# Patient Record
Sex: MALE | Race: WHITE
[De-identification: names, ages, dates, MRNs, and addresses within clinical notes are randomized per-mention and may not be internally consistent; named-entity substitution may affect disease eponyms.]

---

## 2018-04-22 ENCOUNTER — HOSPITAL ENCOUNTER (EMERGENCY)
Dept: HOSPITAL 97 - ER | Age: 83
Discharge: HOME | End: 2018-04-22
Payer: COMMERCIAL

## 2018-04-22 VITALS — TEMPERATURE: 97.8 F

## 2018-04-22 VITALS — SYSTOLIC BLOOD PRESSURE: 123 MMHG | DIASTOLIC BLOOD PRESSURE: 90 MMHG

## 2018-04-22 VITALS — OXYGEN SATURATION: 97 %

## 2018-04-22 DIAGNOSIS — I48.91: ICD-10-CM

## 2018-04-22 DIAGNOSIS — M54.5: ICD-10-CM
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
## 2018-04-22 DIAGNOSIS — N18.9: ICD-10-CM
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
## 2018-04-22 DIAGNOSIS — S00.93XA: Primary | ICD-10-CM

## 2018-04-22 DIAGNOSIS — I25.2: ICD-10-CM

## 2018-04-22 DIAGNOSIS — Z79.01: ICD-10-CM

## 2018-04-22 DIAGNOSIS — I48.92: ICD-10-CM
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
## 2018-04-22 LAB
ALBUMIN SERPL BCP-MCNC: 3.8 G/DL (ref 3.2–5.5)
ALP SERPL-CCNC: 129 IU/L (ref 42–121)
ALT SERPL W P-5'-P-CCNC: 21 IU/L (ref 10–60)
AST SERPL W P-5'-P-CCNC: 27 IU/L (ref 10–42)
BUN BLD-MCNC: 24 MG/DL (ref 6–20)
CKMB CREATINE KINASE MB: 2.5 NG/ML (ref 0.3–4)
GLUCOSE SERPLBLD-MCNC: 97 MG/DL (ref 65–120)
HCT VFR BLD CALC: 36.8 % (ref 39.6–49)
INR BLD: 1.69
LIPASE SERPL-CCNC: 14 U/L (ref 22–51)
LYMPHOCYTES # SPEC AUTO: 1.6 K/UL (ref 0.7–4.9)
MAGNESIUM SERPL-MCNC: 2 MG/DL (ref 1.8–2.5)
MCH RBC QN AUTO: 34.3 PG (ref 27–35)
MCV RBC: 104.9 FL (ref 80–100)
PMV BLD: 8.7 FL (ref 7.6–11.3)
POTASSIUM SERPL-SCNC: 4.7 MEQ/L (ref 3.6–5)
RBC # BLD: 3.51 M/UL (ref 4.33–5.43)

## 2018-04-22 PROCEDURE — 93005 ELECTROCARDIOGRAM TRACING: CPT

## 2018-04-22 PROCEDURE — 71250 CT THORAX DX C-: CPT

## 2018-04-22 PROCEDURE — 70450 CT HEAD/BRAIN W/O DYE: CPT

## 2018-04-22 PROCEDURE — 81003 URINALYSIS AUTO W/O SCOPE: CPT

## 2018-04-22 PROCEDURE — 82553 CREATINE MB FRACTION: CPT

## 2018-04-22 PROCEDURE — 72125 CT NECK SPINE W/O DYE: CPT

## 2018-04-22 PROCEDURE — 87077 CULTURE AEROBIC IDENTIFY: CPT

## 2018-04-22 PROCEDURE — 99285 EMERGENCY DEPT VISIT HI MDM: CPT

## 2018-04-22 PROCEDURE — 84484 ASSAY OF TROPONIN QUANT: CPT

## 2018-04-22 PROCEDURE — 83880 ASSAY OF NATRIURETIC PEPTIDE: CPT

## 2018-04-22 PROCEDURE — 96374 THER/PROPH/DIAG INJ IV PUSH: CPT

## 2018-04-22 PROCEDURE — 80048 BASIC METABOLIC PNL TOTAL CA: CPT

## 2018-04-22 PROCEDURE — 80076 HEPATIC FUNCTION PANEL: CPT

## 2018-04-22 PROCEDURE — 85730 THROMBOPLASTIN TIME PARTIAL: CPT

## 2018-04-22 PROCEDURE — 36415 COLL VENOUS BLD VENIPUNCTURE: CPT

## 2018-04-22 PROCEDURE — 74176 CT ABD & PELVIS W/O CONTRAST: CPT

## 2018-04-22 PROCEDURE — 87186 SC STD MICRODIL/AGAR DIL: CPT

## 2018-04-22 PROCEDURE — 82550 ASSAY OF CK (CPK): CPT

## 2018-04-22 PROCEDURE — 85610 PROTHROMBIN TIME: CPT

## 2018-04-22 PROCEDURE — 87088 URINE BACTERIA CULTURE: CPT

## 2018-04-22 PROCEDURE — 85025 COMPLETE CBC W/AUTO DIFF WBC: CPT

## 2018-04-22 PROCEDURE — 83735 ASSAY OF MAGNESIUM: CPT

## 2018-04-22 PROCEDURE — 83690 ASSAY OF LIPASE: CPT

## 2018-04-22 PROCEDURE — 96361 HYDRATE IV INFUSION ADD-ON: CPT

## 2018-04-22 PROCEDURE — 87086 URINE CULTURE/COLONY COUNT: CPT

## 2018-04-22 PROCEDURE — 71045 X-RAY EXAM CHEST 1 VIEW: CPT

## 2018-04-22 NOTE — EDPHYS
Physician Documentation                                                                           

 [facility]                                                                

Name: Jos Aguayo Jr                                                                              

Age: 88 yrs                                                                                       

Sex: Male                                                                                         

: 1929                                                                                   

MRN: [id number]                                                                                   

Arrival Date: 2018                                                                          

Time: 16:24                                                                                       

Account#: [id number]                                                                            

Bed 5                                                                                             

Private MD:                                                                                       

ED Physician Santino Mcnair                                                                      

HPI:                                                                                              

                                                                                             

18:29 This 88 yrs old  Male presents to ER via EMS with complaints of Head Injury    constance 

      Without LOC-Adult.                                                                          

18:29 The patient or guardian reports pain, swelling, tenderness. The complaints affect the   constance 

      right side of the back of head. Context of injury: The problem was sustained at home.       

      Onset: The symptoms/episode began/occurred just prior to arrival. Associated signs and      

      symptoms: Loss of consciousness: This patient did not experience any loss of                

      consciousness. Severity of symptoms: At their worst the symptoms were very mild, in the     

      emergency department the symptoms have improved, markedly. The patient has experienced      

      similar episodes in the past, several times.                                                

                                                                                                  

Historical:                                                                                       

- Allergies:                                                                                      

16:32 No Known Allergies;                                                                     sv  

- Home Meds:                                                                                      

16:32 Xarelto oral oral [Active]; levothyroxine oral [Active]; B-12 [Active]; D-3 [Active];   sv  

      Simvastatin Oral [Active];                                                                  

- PMHx:                                                                                           

16:32 Atrial Fib; Myocardial infarction;                                                      sv  

- PSHx:                                                                                           

16:32 Double bypass;                                                                          sv  

                                                                                                  

- Family history:: not pertinent.                                                                 

                                                                                                  

                                                                                                  

ROS:                                                                                              

18:29 Constitutional: Negative for fever, chills, and weight loss, Eyes: Negative for injury, constance 

      pain, redness, and discharge, ENT: Negative for injury, pain, and discharge, Neck:          

      Negative for injury, pain, and swelling, Cardiovascular: Negative for chest pain,           

      palpitations, and edema, Respiratory: Negative for shortness of breath, cough,              

      wheezing, and pleuritic chest pain, Abdomen/GI: Negative for abdominal pain, nausea,        

      vomiting, diarrhea, and constipation, Back: Negative for injury and pain, : Negative      

      for injury, bleeding, discharge, and swelling, MS/Extremity: Negative for injury and        

      deformity, Skin: Negative for injury, rash, and discoloration, Psych: Negative for          

      depression, anxiety, suicide ideation, homicidal ideation, and hallucinations,              

      Allergy/Immunology: Negative for hives, rash, and allergies, Endocrine: Negative for        

      neck swelling, polydipsia, polyuria, polyphagia, and marked weight changes.                 

18:29 Neuro: Positive for headache, weakness.                                                     

                                                                                                  

Exam:                                                                                             

18:29 Constitutional:  This is a well developed, well nourished patient who is awake, alert,  constance 

      and in no acute distress. Eyes:  Pupils equal round and reactive to light, extra-ocular     

      motions intact.  Lids and lashes normal.  Conjunctiva and sclera are non-icteric and        

      not injected.  Cornea within normal limits.  Periorbital areas with no swelling,            

      redness, or edema. ENT:  Nares patent. No nasal discharge, no septal abnormalities          

      noted.  Tympanic membranes are normal and external auditory canals are clear.               

      Oropharynx with no redness, swelling, or masses, exudates, or evidence of obstruction,      

      uvula midline.  Mucous membranes moist. Neck:  Trachea midline, no thyromegaly or           

      masses palpated, and no cervical lymphadenopathy.  Supple, full range of motion without     

      nuchal rigidity, or vertebral point tenderness.  No Meningismus. Chest/axilla:  Normal      

      chest wall appearance and motion.  Nontender with no deformity.  No lesions are             

      appreciated. Respiratory:  Lungs have equal breath sounds bilaterally, clear to             

      auscultation and percussion.  No rales, rhonchi or wheezes noted.  No increased work of     

      breathing, no retractions or nasal flaring. Abdomen/GI:  Soft, non-tender, with normal      

      bowel sounds.  No distension or tympany.  No guarding or rebound.  No evidence of           

      tenderness throughout. Back:  No spinal tenderness.  No costovertebral tenderness.          

      Full range of motion. Male :  Normal genitalia with no discharge or lesions. Skin:        

      Warm, dry with normal turgor.  Normal color with no rashes, no lesions, and no evidence     

      of cellulitis. MS/ Extremity:  Pulses equal, no cyanosis.  Neurovascular intact.  Full,     

      normal range of motion. Neuro:  Awake and alert, GCS 15, oriented to person, place,         

      time, and situation.  Cranial nerves II-XII grossly intact.  Motor strength 5/5 in all      

      extremities.  Sensory grossly intact.  Cerebellar exam normal.  Normal gait. Psych:         

      Awake, alert, with orientation to person, place and time.  Behavior, mood, and affect       

      are within normal limits.                                                                   

18:29 Head/face: Noted is hematoma, that is mild, of the  right side of the back of head.         

18:29 Cardiovascular: Rate: normal, Rhythm: irregular, Pulses: Pulses are 4+ in bilateral         

      radial, brachial, femoral, popliteal, posterior tibial and and dorsalis pedis               

      arteries.. Heart sounds: normal, Edema: is not appreciated, JVD: is not appreciated.        

                                                                                                  

Vital Signs:                                                                                      

16:34  / 86; Pulse 98 MON; Resp 20; Temp 97.9(O); Pulse Ox 98% on R/A; Weight 83.91 kg  sv  

      (R); Height 5 ft. 7 in. (170.18 cm) (R); Pain 0/10;                                         

18:00  / 84; Pulse 100 MON; Pulse Ox 97% ;                                              sv  

19:00  / 90; Pulse 108; Pulse Ox 98% ;                                                  sv  

19:09  / 90 Supine; Pulse 110;                                                          mt  

19:09  / 84 Sitting; Pulse 103;                                                         mt  

19:09  / 86 Standing; Pulse 106;                                                        mt  

19:40  / 88; Pulse 120; Resp 16 S; Temp 97.8(O); Pulse Ox 97% on R/A;                   bb  

19:50  / 90; Pulse 113; Resp 17 S; Pulse Ox 96% on R/A;                                 bb  

20:13  / 90; Pulse 118; Resp 20; Pulse Ox 97% on R/A;                                   mt  

16:34 Body Mass Index 28.97 (83.91 kg, 170.18 cm)                                             sv  

16:34 A fib                                                                                   sv  

18:00 A fib                                                                                   sv  

                                                                                                  

Harbert Coma Score:                                                                               

18:29 Eye Response: spontaneous(4). Verbal Response: oriented(5). Motor Response: obeys       [facility] 

      commands(6). Total: 15.                                                                     

                                                                                                  

MDM:                                                                                              

17:00 Patient medically screened.                                                             [facility] 

18:42 Data reviewed: vital signs, nurses notes, lab test result(s), EKG, radiologic studies,  [facility] 

      CT scan, plain films.                                                                       

                                                                                                  

                                                                                             

17:05 Order name: Basic Metabolic Panel; Complete Time: 18:17                                 [facility] 

                                                                                             

17:05 Order name: BNP; Complete Time: 18:17                                                   [facility] 

                                                                                             

17:05 Order name: CBC with Diff; Complete Time: 18:17                                         [facility] 

                                                                                             

17:05 Order name: Ckmb; Complete Time: 18:17                                                  [facility] 

                                                                                             

17:05 Order name: CPK; Complete Time: 18:17                                                   [facility] 

                                                                                             

17:05 Order name: LFT's; Complete Time: 18:17                                                 [facility] 

                                                                                             

17:05 Order name: Magnesium; Complete Time: 18:17                                             [facility] 

                                                                                             

17:05 Order name: PT-INR; Complete Time: 18:17                                                [facility] 

                                                                                             

17:05 Order name: Ptt, Activated; Complete Time: 18:17                                        [facility] 

                                                                                             

17:05 Order name: Troponin (emerg Dept Use Only); Complete Time: 18:17                        [facility] 

                                                                                             

17:05 Order name: Lipase; Complete Time: 18:17                                                [facility] 

                                                                                             

17:05 Order name: Urine Culture                                                               [facility] 

                                                                                             

19:44 Order name: Urine Dipstick--Ancillary (enter results)                                   em1 

                                                                                             

17:05 Order name: XRAY Chest (1 view); Complete Time: 18:17                                   [facility] 

                                                                                             

17:05 Order name: EKG; Complete Time: 17:05                                                   [facility] 

                                                                                             

17:05 Order name: Cardiac monitoring; Complete Time: 17:34                                    [facility] 

                                                                                             

17:05 Order name: EKG - Nurse/Tech; Complete Time: 17:34                                      [facility] 

                                                                                             

17:05 Order name: IV Saline Lock; Complete Time: 17:34                                        [facility] 

                                                                                             

17:05 Order name: Labs collected and sent; Complete Time: 17:34                               [facility] 

                                                                                             

17:09 Order name: CT Chest Abdomen Pelvis W/O Contrast; Complete Time: 18:17                  ss  

                                                                                             

17:39 Order name: Head C Spine Mpr Wo Con; Complete Time: 18:17                               EDMS

                                                                                             

17:05 Order name: O2 Per Protocol; Complete Time: 17:34                                       [facility] 

                                                                                             

17:05 Order name: O2 Sat Monitoring; Complete Time: 17:34                                     [facility] 

                                                                                             

17:05 Order name: Urine Dipstick-Ancillary (obtain specimen); Complete Time: 19:40            [facility] 

                                                                                             

17:05 Order name: Ice pack; Complete Time: 17:13                                              [facility] 

                                                                                             

18:38 Order name: Orthostatics; Complete Time: 19:10                                          constance 

                                                                                                  

Administered Medications:                                                                         

18:04 Drug: NS 0.9% 1000 ml Route: IV; Rate: 125 ml/hr; Site: right forearm;                  sg  

20:06 Follow up: IV Status: Completed infusion; IV Intake: 250ml                              bb  

19:40 Drug: Digoxin 0.5 mg Route: IVP; Site: right forearm;                                   bb  

20:15 Follow up: Response: No adverse reaction                                                bb  

19:43 Not Given (Duplicate Order): Lopressor (metoprolol TARTRATE) 50 mg PO once              constance 

19:45 Drug: Digoxin Tablet 0.25 mg Route: PO;                                                 bb  

20:15 Follow up: Response: No adverse reaction                                                bb  

19:45 Drug: ToPROL XL (metoprolol SUCCINATE XL) 50 mg Route: PO;                              bb  

20:15 Follow up: Response: No adverse reaction                                                bb  

                                                                                                  

                                                                                                  

Disposition:                                                                                      

18 18:34 Discharged to Home. Impression: Fall due to bumping against object, Superficial    

  injury of head - hematoma, Atrial fibrillation and flutter, Unspecified                         

  kidney failure - chronic, Low back pain.                                                        

- Condition is Stable.                                                                            

- Discharge Instructions: Atrial Fibrillation, Back Pain, Adult, Head Injury, Adult,              

  Fall Prevention and Home Safety, Back Injury Prevention, Easy-to-Read, Back Pain,               

  Adult, Easy-to-Read, Fall Prevention and Home Safety, Easy-to-Read, Head Injury,                

  Adult, Easy-to-Read, Atrial Fibrillation, Easy-to-Read.                                         

- Prescriptions for Digitek 125 mcg Oral Tablet - take 1 tablet by ORAL route once                

  daily; 20 tablet. Tylenol- Codeine #3 300-30 mg Oral Tablet - take 1 tablet by ORAL             

  route every 6 hours As needed; 24 tablet. Toprol XL 25 mg Oral Tablet - take 1 tablet           

  by ORAL route once daily; 20 tablet.                                                            

- Medication Reconciliation Form, Thank You Letter, Antibiotic Education, Prescription            

  Opioid Use form.                                                                                

- Follow up: Private Physician; When: 2 - 3 days; Reason: Recheck today's complaints,             

  Continuance of care, Re-evaluation by your physician. Follow up: Cecil Hemphill MD;             

  When: 2 - 3 days; Reason: Recheck today's complaints, Continuance of care,                      

  Re-evaluation by your physician. Follow up: Alex Robledo MD; When: 2 - 3 days;               

  Reason: Recheck today's complaints, Continuance of care, Re-evaluation by your                  

  physician.                                                                                      

- Problem is new.                                                                                 

- Symptoms have improved.                                                                         

                                                                                                  

                                                                                                  

                                                                                                  

Signatures:                                                                                       

Dispatcher [employer]                           Jaida Ocampo, Devon Grimaldo RN, RN RN sg Anderson, Corey, MD MD cha Ballard, Brenda, RN                     RN   bb                                                   

                                                                                                  

Corrections: (The following items were deleted from the chart)                                    

17:39 17:15 Head Brain Wo Cont+CT.RAD.BRZ ordered. EDMS                                       EDMS

17:48 16:33 Head C Spine MPR Wo Con+CT.RAD.BRZ ordered. EDMS                                  EDMS

17:49 17:05 Head C Spine Cap Wo Con+CT.RAD.BRZ ordered. EDMS                                  EDMS

17:50 17:06 Head C Spine Cap Wo Con+CT.RAD.BRZ ordered. EDMS                                  EDMS

                                                                                                  

**************************************************************************************************

## 2018-04-22 NOTE — RAD REPORT
EXAM DESCRIPTION:  CT - Chest Abd Pelvis Wo Con - 4/22/2018 5:25 pm

 

CLINICAL HISTORY:  Chest and abdomen pain.

History of trauma, fall.

 

COMPARISON:  None.

 

TECHNIQUE  All CT scans are performed using dose optimization technique as appropriate and may includ
e automated exposure control or mA/KV adjustment according to patient size.

 

FINDINGS:  Emphysematous changes are present throughout the lungs.A moderate hiatal hernia.No pleural
 or pericardial effusion.No intrathoracic adenopathy.

 

The liver, spleen, pancreas, adrenal glands and kidneys are within normal limits. Aortic atherosclero
sis.

 

No bowel obstruction, free air, free fluid or abscess. No pathologic lymphadenopathy in the abdomen o
r pelvis.

 

Diffuse osteopenia. No displaced fractures evident.

 

IMPRESSION:  No acute abnormality is detected.

 

COPD.

## 2018-04-22 NOTE — ER
Nurse's Notes                                                                                     

 [facility]                                                                

Name: Jos Aguayo Jr                                                                              

Age: 88 yrs                                                                                       

Sex: Male                                                                                         

: 1929                                                                                   

MRN: [id number]                                                                                   

Arrival Date: 2018                                                                          

Time: 16:24                                                                                       

Account#: [id number]                                                                            

Bed 5                                                                                             

Private MD:                                                                                       

Diagnosis: Fall due to bumping against object;Superficial injury of head-hematoma;Atrial          

  fibrillation and flutter;Unspecified kidney failure-chronic;Low back pain                       

                                                                                                  

Presentation:                                                                                     

                                                                                             

16:19 Presenting complaint: EMS states: head injury with no LOC, open hematoma to right       sv  

      parietal area. Pt stated that his legs gave out and struck his head on unknown object.      

      c/o low back pain. BS-99 /72. Care prior to arrival: Glucose check: 99. Mechanism     

      of Injury: Fall from standing position.                                                     

16:19 Acuity: LM 3                                                                           sv  

16:19 Method Of Arrival: EMS: [facility]                                                sv  

                                                                                                  

Triage Assessment:                                                                                

16:19 General: Appears in no apparent distress. uncomfortable, slender, Behavior is calm,     sv  

      cooperative, appropriate for age. Pain: Denies pain. EENT: No signs and/or symptoms         

      were reported regarding the EENT system. Neuro: Level of Consciousness is awake, alert,     

      obeys commands, Oriented to person, place, time, situation, Moves all extremities. Full     

      function Speech is normal, Facial symmetry appears normal, Reports "my legs gave out on     

      me". Cardiovascular: Heart tones S1 S2 present Patient's skin is warm and dry. Pulses       

      are 3+ in right radial artery and left radial artery. Respiratory: Respiratory effort       

      is even, unlabored, Respiratory pattern is regular, symmetrical, Breath sounds are          

      clear bilaterally. GI: No signs and/or symptoms were reported involving the                 

      gastrointestinal system. : No signs and/or symptoms were reported regarding the           

      genitourinary system. Derm: Skin is normal. Musculoskeletal: No signs and/or symptoms       

      reported regarding the musculoskeletal system. Injury Description: Head injury              

      sustained to right side of the back of head is open, did not have loss of                   

      consciousness, dried blood was sustained 30-60 minutes ago.                                 

                                                                                                  

Trauma Activation: Not Applicable                                                                 

 Physician: ED Physician; Name: ; Notified At: ; Arrived At:                                      

 Physician: General Surgeon; Name: ; Notified At: ; Arrived At:                                   

 Physician: Radiology; Name: ; Notified At: ; Arrived At:                                         

 Physician: Respiratory; Name: ; Notified At: ; Arrived At:                                       

 Physician: Lab; Name: ; Notified At: ; Arrived At:                                               

                                                                                                  

Historical:                                                                                       

- Allergies:                                                                                      

16:32 No Known Allergies;                                                                     sv  

- Home Meds:                                                                                      

16:32 Xarelto oral oral [Active]; levothyroxine oral [Active]; B-12 [Active]; D-3 [Active];   sv  

      Simvastatin Oral [Active];                                                                  

- PMHx:                                                                                           

16:32 Atrial Fib; Myocardial infarction;                                                      sv  

- PSHx:                                                                                           

16:32 Double bypass;                                                                          sv  

                                                                                                  

- Family history:: not pertinent.                                                                 

                                                                                                  

                                                                                                  

Screenin:34 Abuse screen: Denies threats or abuse. Denies injuries from another. Nutritional        sv  

      screening: No deficits noted. Tuberculosis screening: No symptoms or risk factors           

      identified. Fall Risk None identified.                                                      

                                                                                                  

Assessment:                                                                                       

16:45 Reassessment: See triage assessment.                                                    sv  

17:15 Reassessment: Patient appears in no apparent distress at this time. No changes from     sv  

      previously documented assessment. Patient and/or family updated on plan of care and         

      expected duration. Pain level reassessed. Patient is alert, oriented x 3, equal             

      unlabored respirations, skin warm/dry/pink.                                                 

19:30 Reassessment: Patient and/or family updated on plan of care and expected duration. Pain bb  

      level reassessed. Patient is alert, oriented x 3, equal unlabored respirations, skin        

      warm/dry/pink. IV site patent, intact with fluids infusing. Family at bedside, pt           

      awaiting discharge for administration of medications as ordered per MAR.                    

                                                                                                  

Vital Signs:                                                                                      

16:34  / 86; Pulse 98 MON; Resp 20; Temp 97.9(O); Pulse Ox 98% on R/A; Weight 83.91 kg  sv  

      (R); Height 5 ft. 7 in. (170.18 cm) (R); Pain 0/10;                                         

18:00  / 84; Pulse 100 MON; Pulse Ox 97% ;                                              sv  

19:00  / 90; Pulse 108; Pulse Ox 98% ;                                                  sv  

19:09  / 90 Supine; Pulse 110;                                                          mt  

19:09  / 84 Sitting; Pulse 103;                                                         mt  

19:09  / 86 Standing; Pulse 106;                                                        mt  

19:40  / 88; Pulse 120; Resp 16 S; Temp 97.8(O); Pulse Ox 97% on R/A;                   bb  

19:50  / 90; Pulse 113; Resp 17 S; Pulse Ox 96% on R/A;                                 bb  

20:13  / 90; Pulse 118; Resp 20; Pulse Ox 97% on R/A;                                   mt  

16:34 Body Mass Index 28.97 (83.91 kg, 170.18 cm)                                             sv  

16:34 A fib                                                                                   sv  

18:00 A fib                                                                                   sv  

                                                                                                  

[place] Coma Score:                                                                               

18:29 Eye Response: spontaneous(4). Verbal Response: oriented(5). Motor Response: obeys       constance 

      commands(6). Total: 15.                                                                     

                                                                                                  

ED Course:                                                                                        

16:00 Initial lab(s) drawn, by ED staff, sent to lab. Inserted saline lock: 20 gauge in right sv  

      forearm, using aseptic technique. Blood collected.                                          

16:24 Patient arrived in ED.                                                                  sv  

16:25 Jaida Sebastian, RN is Primary Nurse.                                                  sv  

16:31 Triage completed.                                                                       sv  

16:34 Arm band placed on left wrist.                                                          sv  

16:34 Patient has correct armband on for positive identification. Placed in gown. Bed in low  sv  

      position. Call light in reach. Side rails up X2. Cardiac monitor on. Pulse ox on. NIBP      

      on. Door closed. Head of bed elevated.                                                      

17:00 Santino Mcnair MD is Attending Physician.                                             constance 

17:25 CT Chest Abdomen Pelvis W/O Contrast In Process Unspecified.                            EDMS

17:48 Head C Spine Mpr Wo Con In Process Unspecified.                                         EDMS

17:49 XRAY Chest (1 view) In Process Unspecified.                                             EDMS

18:33 Cecil Hemphill MD is Referral Physician.                                                constance 

18:44 Alex Robledo MD is Referral Physician.                                               constance 

20:16 No provider procedures requiring assistance completed. IV discontinued, intact,         bb  

      bleeding controlled, No redness/swelling at site. Pressure dressing applied.                

                                                                                                  

Administered Medications:                                                                         

18:04 Drug: NS 0.9% 1000 ml Route: IV; Rate: 125 ml/hr; Site: right forearm;                  sg  

20:06 Follow up: IV Status: Completed infusion; IV Intake: 250ml                              bb  

19:40 Drug: Digoxin 0.5 mg Route: IVP; Site: right forearm;                                   bb  

20:15 Follow up: Response: No adverse reaction                                                bb  

19:43 Not Given (Duplicate Order): Lopressor (metoprolol TARTRATE) 50 mg PO once              constance 

19:45 Drug: Digoxin Tablet 0.25 mg Route: PO;                                                 bb  

20:15 Follow up: Response: No adverse reaction                                                bb  

19:45 Drug: ToPROL XL (metoprolol SUCCINATE XL) 50 mg Route: PO;                              bb  

20:15 Follow up: Response: No adverse reaction                                                bb  

                                                                                                  

                                                                                                  

Intake:                                                                                           

20:06 IV: 250ml; Total: 250ml.                                                                bb  

                                                                                                  

Outcome:                                                                                          

18:34 Discharge ordered by MD.                                                                constance 

20:16 Discharged to home via wheelchair, with family.                                         bb  

20:16 Condition: stable                                                                           

20:16 Discharge instructions given to patient, family, Instructed on discharge instructions,      

      follow up and referral plans. medication usage, Demonstrated understanding of               

      instructions, follow-up care, medications, Prescriptions given X 3.                         

20:22 Patient left the ED.                                                                    bb  

                                                                                                  

Addendum:                                                                                         

2018                                                                                        

     10:01 Addendum: Culture Results: Positive urine culture. Phone call Attempt #1 called patient s
s

           who states that he has no UTI symptoms and saw his PCP, Dr. Robledo, yesterday who     

           states that he will go over all test results from ER visit.                            

                                                                                                  

Signatures:                                                                                       

Dispatcher [employer]                           Jaida Ocampo RN RN sv Gay, Steven, RN RN sg Anderson, Corey, MD MD cha Ballard, Brenda, RN RN bb Smirch, Shelby, RN RN ss Thompson, Moriah                             mt                                                   

                                                                                                  

Corrections: (The following items were deleted from the chart)                                    

                                                                                             

19:07 16:34  / 86; Pulse 98bpm; Resp 20bpm; Pulse Ox 98% RA; Temp 97.9F Oral; 83.91 kg  sv  

      Reported; Height 5 ft. 7 in. Reported; BMI: 28.9; Pain 0/10; sv                             

                                                                                                  

**************************************************************************************************

## 2018-04-22 NOTE — RAD REPORT
EXAM DESCRIPTION:  RAD - Chest Single View - 4/22/2018 5:49 pm

 

CLINICAL HISTORY:  Fall, chest pain

 

COMPARISON:  01/14/2018, 09/29/2017

 

FINDINGS:  Portable technique limits examination quality.

 

Emphysematous underinflated lungs noted. A hiatal hernia is likely present. The heart is mildly promi
nent size. Sternotomy wires seen. No displaced fractures.

 

IMPRESSION:  COPD.

## 2018-04-23 NOTE — EKG
Test Date:    2018-04-22               Test Time:    17:46:55

Technician:   EMT                                    

                                                     

MEASUREMENT RESULTS:                                       

Intervals:                                           

Rate:         114                                    

NM:                                                  

QRSD:         94                                     

QT:           342                                    

QTc:          471                                    

Axis:                                                

P:                                                   

NM:                                                  

QRS:          -34                                    

T:            81                                     

                                                     

INTERPRETIVE STATEMENTS:                                       

                                                     

Atrial fibrillation with rapid ventricular response

Left axis deviation

Abnormal ECG

Compared to ECG 01/14/2018 10:42:58

no significant change from previous ECG

Electronically Signed On 04-23-18 07:03:59 CDT by Cecil Hemphill

## 2018-06-06 ENCOUNTER — HOSPITAL ENCOUNTER (INPATIENT)
Dept: HOSPITAL 97 - ER | Age: 83
LOS: 6 days | Discharge: NURSING, REHAB OR LONG-TERM CARE | DRG: 563 | End: 2018-06-12
Attending: INTERNAL MEDICINE | Admitting: INTERNAL MEDICINE
Payer: COMMERCIAL

## 2018-06-06 VITALS — BODY MASS INDEX: 27.3 KG/M2
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
## 2018-06-06 DIAGNOSIS — I25.10: ICD-10-CM

## 2018-06-06 DIAGNOSIS — S09.90XA: ICD-10-CM

## 2018-06-06 DIAGNOSIS — K59.00: ICD-10-CM

## 2018-06-06 DIAGNOSIS — R41.0: ICD-10-CM

## 2018-06-06 DIAGNOSIS — Z95.1: ICD-10-CM

## 2018-06-06 DIAGNOSIS — M19.90: ICD-10-CM

## 2018-06-06 DIAGNOSIS — E03.9: ICD-10-CM

## 2018-06-06 DIAGNOSIS — E86.9: ICD-10-CM
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
## 2018-06-06 DIAGNOSIS — E78.5: ICD-10-CM

## 2018-06-06 DIAGNOSIS — S82.101A: Primary | ICD-10-CM

## 2018-06-06 DIAGNOSIS — N17.9: ICD-10-CM

## 2018-06-06 DIAGNOSIS — I48.2: ICD-10-CM

## 2018-06-06 DIAGNOSIS — D64.9: ICD-10-CM

## 2018-06-06 PROCEDURE — 82553 CREATINE MB FRACTION: CPT

## 2018-06-06 PROCEDURE — 87088 URINE BACTERIA CULTURE: CPT

## 2018-06-06 PROCEDURE — 96360 HYDRATION IV INFUSION INIT: CPT

## 2018-06-06 PROCEDURE — 83880 ASSAY OF NATRIURETIC PEPTIDE: CPT

## 2018-06-06 PROCEDURE — 72170 X-RAY EXAM OF PELVIS: CPT

## 2018-06-06 PROCEDURE — 80076 HEPATIC FUNCTION PANEL: CPT

## 2018-06-06 PROCEDURE — 83735 ASSAY OF MAGNESIUM: CPT

## 2018-06-06 PROCEDURE — 82550 ASSAY OF CK (CPK): CPT

## 2018-06-06 PROCEDURE — 85730 THROMBOPLASTIN TIME PARTIAL: CPT

## 2018-06-06 PROCEDURE — 85610 PROTHROMBIN TIME: CPT

## 2018-06-06 PROCEDURE — 85025 COMPLETE CBC W/AUTO DIFF WBC: CPT

## 2018-06-06 PROCEDURE — 99285 EMERGENCY DEPT VISIT HI MDM: CPT

## 2018-06-06 PROCEDURE — 93005 ELECTROCARDIOGRAM TRACING: CPT

## 2018-06-06 PROCEDURE — 97163 PT EVAL HIGH COMPLEX 45 MIN: CPT

## 2018-06-06 PROCEDURE — 76377 3D RENDER W/INTRP POSTPROCES: CPT

## 2018-06-06 PROCEDURE — 81003 URINALYSIS AUTO W/O SCOPE: CPT

## 2018-06-06 PROCEDURE — 74018 RADEX ABDOMEN 1 VIEW: CPT

## 2018-06-06 PROCEDURE — 36415 COLL VENOUS BLD VENIPUNCTURE: CPT

## 2018-06-06 PROCEDURE — 87086 URINE CULTURE/COLONY COUNT: CPT

## 2018-06-06 PROCEDURE — 73700 CT LOWER EXTREMITY W/O DYE: CPT

## 2018-06-06 PROCEDURE — 80048 BASIC METABOLIC PNL TOTAL CA: CPT

## 2018-06-06 NOTE — EDPHYS
Physician Documentation                                                                           

 [facility]                                                                

Name: Jos Aguayo Jr                                                                              

Age: 88 yrs                                                                                       

Sex: Male                                                                                         

: 1929                                                                                   

MRN: [id number]                                                                                   

Arrival Date: 2018                                                                          

Time: 18:59                                                                                       

Account#: [id number]                                                                            

Bed 24                                                                                            

Private MD:                                                                                       

ED Physician Santino Mcnair                                                                      

HPI:                                                                                              

                                                                                             

20:10 This 88 yrs old  Male presents to ER via EMS with complaints of Fall Injury.   jr8 

20:10 Details of fall: The patient fell from an upright position. Onset: The symptoms/episode jr8 

      began/occurred acutely, today. Associated injuries: The patient sustained injury to the     

      head. Severity of symptoms: At their worst the symptoms were mild, in the emergency         

      department the symptoms are unchanged. The patient has not experienced similar symptoms     

      in the past. The patient has not recently seen a physician. Patient stated that he was      

      using his walker and accidently fell backwards. Hit head. Complains of mild head pain       

      and low back pain. Stated that he has been constipated and has had the low back pain        

      for some time .                                                                             

                                                                                                  

Historical:                                                                                       

- Allergies:                                                                                      

19:31 No Known Allergies;                                                                     kr2 

- Home Meds:                                                                                      

19:31 B-12 [Active]; D-3 [Active]; Simvastatin Oral [Active]; levothyroxine oral [Active];    kr2 

      Xarelto Oral [Active];                                                                      

- PMHx:                                                                                           

19:31 Atrial Fib; Myocardial infarction;                                                      kr2 

- PSHx:                                                                                           

19:31 Double bypass;                                                                          kr2 

                                                                                                  

- Immunization history: Last tetanus immunization: unknown.                                       

- Social history:: Smoking status: Patient/guardian denies using tobacco.                         

- Ebola Screening: : No symptoms or risks identified at this time.                                

                                                                                                  

                                                                                                  

ROS:                                                                                              

20:10 Eyes: Negative for injury, pain, redness, and discharge, ENT: Negative for injury,      jr8 

      pain, and discharge, Neck: Negative for injury, pain, and swelling, Cardiovascular:         

      Negative for chest pain, palpitations, and edema, Respiratory: Negative for shortness       

      of breath, cough, wheezing, and pleuritic chest pain, Abdomen/GI: Negative for              

      abdominal pain, nausea, vomiting, diarrhea, and constipation, MS/Extremity: Negative        

      for injury and deformity, Neuro: Negative for headache, weakness, numbness, tingling,       

      and seizure.                                                                                

20:10 Back: Positive for pain at rest, of the low back area.                                      

20:10 Skin: Positive for abrasion(s), of the scalp.                                               

                                                                                                  

Exam:                                                                                             

20:10 Eyes:  Pupils equal round and reactive to light, extra-ocular motions intact.  Lids and jr8 

      lashes normal.  Conjunctiva and sclera are non-icteric and not injected.  Cornea within     

      normal limits.  Periorbital areas with no swelling, redness, or edema. ENT:  Nares          

      patent. No nasal discharge, no septal abnormalities noted.  Tympanic membranes are          

      normal and external auditory canals are clear.  Oropharynx with no redness, swelling,       

      or masses, exudates, or evidence of obstruction, uvula midline.  Mucous membranes           

      moist. Neck:  Trachea midline, no thyromegaly or masses palpated, and no cervical           

      lymphadenopathy.  Supple, full range of motion without nuchal rigidity, or vertebral        

      point tenderness.  No Meningismus. Chest/axilla:  Normal chest wall appearance and          

      motion.  Nontender with no deformity.  No lesions are appreciated. Cardiovascular:          

      Regular rate and rhythm with a normal S1 and S2.  No gallops, murmurs, or rubs.  Normal     

      PMI, no JVD.  No pulse deficits. Respiratory:  Lungs have equal breath sounds               

      bilaterally, clear to auscultation and percussion.  No rales, rhonchi or wheezes noted.     

       No increased work of breathing, no retractions or nasal flaring. Abdomen/GI:  Soft,        

      non-tender, with normal bowel sounds.  No distension or tympany.  No guarding or            

      rebound.  No evidence of tenderness throughout. Back:  No spinal tenderness.  No            

      costovertebral tenderness.  Full range of motion. Skin:  Warm, dry with normal turgor.      

      Normal color with no rashes, no lesions, and no evidence of cellulitis. MS/ Extremity:      

      Pulses equal, no cyanosis.  Neurovascular intact.  Full, normal range of motion. Neuro:     

       Awake and alert, GCS 15, oriented to person, place, time, and situation.  Cranial          

      nerves II-XII grossly intact.  Motor strength 5/5 in all extremities.  Sensory grossly      

      intact.  Cerebellar exam normal.  Normal gait.                                              

20:10 Head/face: Noted is abrasion(s), that are mild, of the  top of head.                        

                                                                                                  

Vital Signs:                                                                                      

19:27  / 78; Pulse 109; Resp 19; Temp 98; Pulse Ox 95% 2 lpm ; Weight 81.65 kg; Height  kr2 

      5 ft. 8 in. (172.72 cm); Pain 0/10;                                                         

20:30  / 85; Pulse 95; Resp 17; Pulse Ox 95% on 2 lpm NC;                               kr2 

21:30  / 89; Pulse 105; Resp 19; Pulse Ox 96% on 2 lpm NC;                              kr2 

22:30  / 71; Pulse 113; Resp 19; Pulse Ox 96% on 2 lpm NC;                              kr2 

                                                                                             

01:06  / 79; Pulse 110; Resp 20; Pulse Ox 95% on 2 lpm NC;                              kr2 

02:52  / 82; Pulse 110; Resp 20; Pulse Ox 96% on R/A;                                   mb3 

                                                                                             

19:27 Body Mass Index 27.37 (81.65 kg, 172.72 cm)                                             kr2 

                                                                                                  

Calvin Coma Score:                                                                               

                                                                                             

19:27 Eye Response: spontaneous(4). Verbal Response: oriented(5). Motor Response: obeys       kr2 

      commands(6). Total: 15.                                                                     

                                                                                                  

Trauma Score (Adult):                                                                             

19:27 Eye Response: spontaneous(1); Verbal Response: oriented(1); Motor Response: obeys       kr2 

      commands(2); Systolic BP: > 89 mm Hg(4); Respiratory Rate: 10 to 29 per min(4); [place]     

      Score: 15; Trauma Score: 12                                                                 

                                                                                                  

MDM:                                                                                              

19:05 Patient medically screened.                                                             jr8 

20:10 ED course: Patient is on xarelto. Recommended with head trauma on blood thinner that we jr8 

      CT scan his head to insure there is not a brain bleed. Patient refuses CT. Stated that      

      he cannot lay down flat. Reiterated the risks of that. Stated that he has lived a good      

      life and is ok with not having CT scan but would be ok with plain films. .                  

21:52 Data reviewed: vital signs, nurses notes, radiologic studies, plain films. Data         jr8 

      interpreted: Pulse oximetry: on room air is 95 %. Interpretation: normal. Counseling: I     

      had a detailed discussion with the patient and/or guardian regarding: the historical        

      points, exam findings, and any diagnostic results supporting the discharge/admit            

      diagnosis, radiology results, the need for outpatient follow up, a orthopedic surgeon,      

      to return to the emergency department if symptoms worsen or persist or if there are any     

      questions or concerns that arise at home. ED course: Discussed with patient that there      

      are compression fractures present. Age undetermined as to when they occurred. Patient       

      stated that he has fallen in past. Explained to him that he should follow up with ortho     

      spine for further assessment. Patient understood and would. Patient reassessed as well      

      and is hemodynamically stable. No mental status changes. Will DC home to follow up.         

                                                                                             

01:52 Patient medically screened.                                                             [facility] 

                                                                                                  

                                                                                             

00:14 Order name: Basic Metabolic Panel; Complete Time: :                                 kr2 

                                                                                             

00:14 Order name: BNP; Complete Time:                                                    kr2 

                                                                                             

00:14 Order name: CBC with Diff; Complete Time:                                          kr2 

                                                                                             

00:14 Order name: Ckmb; Complete Time:                                                   kr2 

                                                                                             

00:14 Order name: CPK; Complete Time:                                                    kr 

                                                                                             

00:14 Order name: LFT's; Complete Time:                                                  kr2 

                                                                                             

00:14 Order name: Magnesium; Complete Time:                                              [facility] 

                                                                                             

00:58 Order name: PT-INR; Complete Time:                                                 [facility] 

                                                                                             

00:58 Order name: Ptt, Activated; Complete Time:                                         kr 

                                                                                             

01:58 Order name: Urine Culture                                                               [facility] 

                                                                                             

01:58 Order name: Urine Culture                                                               EDMS

                                                                                             

02:14 Order name: Basic Metabolic Panel                                                       EDMS

                                                                                             

02:14 Order name: Basic Metabolic Panel                                                       EDMS

                                                                                             

02:14 Order name: CBC with Automated Diff                                                     EDMS

                                                                                             

19:22 Order name: XRAY Pelvis; Complete Time: 20:06                                             

                                                                                             

19:22 Order name: XRAY KUB; Complete Time: 20:08                                                

                                                                                             

00:14 Order name: Labs collected and sent; Complete Time: 00:26                               kr2 

                                                                                             

01:58 Order name: CT Lumbar Spine Wo Con                                                      constance 

                                                                                             

02:14 Order name: Heart Healthy                                                               EDMS

                                                                                             

02:14 Order name: CBC with Automated Diff                                                     EDMS

                                                                                             

02:14 Order name: Lumbar Spine Wo Con                                                         EDMS

                                                                                             

02:31 Order name: Urine Dipstick--Ancillary (enter results)                                   ms  

                                                                                             

02:56 Order name: Tib Fib Right XRAY                                                          fc  

                                                                                             

03:08 Order name: Urine Dipstick-Ancillary; Complete Time: 03:27                              EDMS

                                                                                             

03:29 Order name: Knee Immobilizer: 24 inch; Complete Time: 03:35                             [facility] 

                                                                                             

03:29 Order name: Ice pack; Complete Time: 03:35                                              [facility] 

                                                                                                  

Administered Medications:                                                                         

02:21 Drug: Lopressor (metoprolol TARTRATE) 50 mg Route: PO;                                  mb3 

02:55 Follow up: Response: No adverse reaction                                                mb3 

02:22 Drug: NS 0.9% 1000 ml Route: IV; Rate: 75 ml/hr; Site: right forearm;                   mb3 

02:56 Follow up: Response: No adverse reaction; IV Status: Infusion continued upon admission; mb3 

      IV Intake: 75ml                                                                             

03:01 Drug: Tylenol 650 mg Route: PO;                                                         mb3 

03:04 Follow up: Response: No adverse reaction                                                mb3 

                                                                                                  

                                                                                                  

Disposition:                                                                                      

02:04 Co-signature as Attending Physician, Santino Mcnair MD I agree with the assessment and  [facility] 

      plan of care.                                                                               

                                                                                                  

Disposition:                                                                                      

18 02:04 Hospitalization ordered by Alex Robledo for Observation. Preliminary             

  diagnosis are Fall due to bumping against object, Wedge compression fracture                    

  of unspecified lumbar vertebra, Atrial fibrillation and flutter, Fracture of                    

  shaft of tibia - proximal, nondisplaced.                                                        

- Bed requested for Telemetry/MedSurg (observation).                                              

- Status is Observation.                                                                      fc  

- Condition is Fair.                                                                              

- Problem is new.                                                                                 

- Symptoms have improved.                                                                         

UTI on Admission? No                                                                              

                                                                                                  

                                                                                                  

                                                                                                  

Signatures:                                                                                       

Dispatcher [employer]                           [facility]                                                 

Kamille Caldwell, RN                        Santino Simon MD MD cha Chretien, Felicia RN                   RN                                                      

Manuel Lancaster PA                        PA   jr8                                                  

Melvina Montanez RN                       RN   kr2                                                  

Evan Edmond, RN                       RN   mb3                                                  

                                                                                                  

Corrections: (The following items were deleted from the chart)                                    

                                                                                             

21:54 20:10 ED course: Patient is on xarelto. Recommended with head trauma on blood thinner   jr8 

      that we CT scan his head to insure there is not a brain bleed. Patient refuses CT.          

      Stated that he cannot lay down flat. Reiterated the risks of that. Stated that he has       

      lived a good life and is ok with not having CT scan but would be ok with plain films. .     

      jr8                                                                                         

21:56 21:55 2018 21:55 Discharged to Home. Impression: Wedge compression fracture of    jr8 

      fourth lumbar vertebra. Condition is Stable. Forms are Medication Reconciliation Form,      

      Thank You Letter, Antibiotic Education, Prescription Opioid Use. Follow up: Private         

      Physician; When: 2 - 3 days; Reason: Recheck today's complaints, Continuance of care,       

      Re-evaluation by your physician. Problem is new. Symptoms have improved. jr8                

                                                                                             

01:59  21:56 2018 21:55 Discharged to Home. Impression: Lumbar compression         constance 

      fractures . Condition is Stable. Forms are Medication Reconciliation Form, Thank You        

      Letter, Antibiotic Education, Prescription Opioid Use. Follow up: Private Physician;        

      When: 2 - 3 days; Reason: Recheck today's complaints, Continuance of care,                  

      Re-evaluation by your physician. Problem is new. Symptoms have improved. jr8                

                                                                                             

02:11 02:04 Hospitalization Ordered by Alex Robledo MD for Observation. Preliminary          mw  

      diagnosis is Fall due to bumping against object; Wedge compression fracture of              

      unspecified lumbar vertebra; Atrial fibrillation and flutter. Bed requested for             

      Telemetry/MedSurg (observation). Status is Observation. Condition is Fair. Problem is       

      new. Symptoms have improved. UTI on Admission? No. constance                                      

03:37 02:11 2018 02:04 Hospitalization Ordered by Alex Robledo MD for Observation.     constance 

      Preliminary diagnosis is Fall due to bumping against object; Wedge compression fracture     

      of unspecified lumbar vertebra; Atrial fibrillation and flutter. Bed requested for          

      Telemetry/MedSurg (observation). Status is Observation. Condition is Fair. Problem is       

      new. Symptoms have improved. UTI on Admission? No. mw                                       

04:30 03:37 2018 02:04 Hospitalization Ordered by Alex Robledo MD for Observation.     fc  

      Preliminary diagnosis is Fall due to bumping against object; Wedge compression fracture     

      of unspecified lumbar vertebra; Atrial fibrillation and flutter; Fracture of shaft of       

      tibia - proximal, nondisplaced. Bed requested for Telemetry/MedSurg (observation).          

      Status is Observation. Condition is Fair. Problem is new. Symptoms have improved. UTI       

      on Admission? No. constance                                                                       

                                                                                                  

**************************************************************************************************

## 2018-06-06 NOTE — RAD REPORT
EXAM DESCRIPTION:  RAD - Abdomen 1 View ([facility]) - 6/6/2018 7:43 pm

 

CLINICAL HISTORY:  Fall, abdominal pain

 

COMPARISON:  None.

 

FINDINGS:  No free air is identified. Bowel does not appear to be obstructed. There are numerous nond
ilated air-filled small bowel loops. This could be baseline for the patient or a nonspecific enteriti
s. No suspicious calcifications.

 

Prominent degenerative changes are present in the lumbar spine and the patient appears to have multip
le compression fractures of unknown age. Bones are osteopenic. Pelvis findings are separately detaile
d.

 

IMPRESSION:  Nonspecific bowel gas pattern. No obstruction or free air.

 

Multiple lumbar spine compression fractures with osteopenia. Age of the fractures is unknown.

## 2018-06-06 NOTE — ER
Nurse's Notes                                                                                     

 [facility]                                                                

Name: Jos Aguayo Jr                                                                              

Age: 88 yrs                                                                                       

Sex: Male                                                                                         

: 1929                                                                                   

MRN: [id number]                                                                                   

Arrival Date: 2018                                                                          

Time: 18:59                                                                                       

Account#: [id number]                                                                            

Bed 24                                                                                            

Private MD:                                                                                       

Diagnosis: Fall due to bumping against object;Wedge compression fracture of unspecified lumbar    

  vertebra;Atrial fibrillation and flutter;Fracture of shaft of tibia-proximal,                   

  nondisplaced                                                                                    

                                                                                                  

Presentation:                                                                                     

                                                                                             

19:11 Presenting complaint: EMS states: patient was walking out of his house with his walker  kr2 

      and fell backwards hitting his head on the wall. He denies losing consciousness. He         

      denies having any pain unless he is moving around and the pain is in his back. He also      

      says this is the same pain he feels when he is constipated and he does report being         

      constipated. Care prior to arrival: IV initiated. 22 GA, in the right forearm.              

      Mechanism of Injury: Fall from standing position. Trauma event details: Injury occurred     

      in the [facility], Injury occurred: at home. Injury occurred: 2018.        

19:11 Acuity: LM 3                                                                           kr2 

19:11 Method Of Arrival: EMS: [facility]                                                kr2 

19:29 Transition of care: patient was not received from another setting of care. Onset of     kr2 

      symptoms was 2018. Risk Assessment: Do you want to hurt yourself or someone        

      else? Patient reports no desire to harm self or others. Initial Sepsis Screen: Does the     

      patient meet any 2 criteria? No. Patient's initial sepsis screen is negative. Does the      

      patient have a suspected source of infection? No. Patient's initial sepsis screen is        

      negative.                                                                                   

                                                                                                  

Trauma Activation: Alert                                                                          

 Physician: ED Physician; Name: Yael; Notified At:  19:00; Arrived              

  At:  19:05                                                                            

 Physician: General Surgeon; Name: ; Notified At:  19:00; Arrived At:                   

 Physician: Radiology; Name: ; Notified At:  19:00; Arrived At:               

  19:05                                                                                           

 Physician: Respiratory; Name: ; Notified At:  19:00; Arrived At:                       

 Physician: Lab; Name: ; Notified At:  19:00; Arrived At:                               

                                                                                                  

Historical:                                                                                       

- Allergies:                                                                                      

19:31 No Known Allergies;                                                                     kr2 

- Home Meds:                                                                                      

19:31 B-12 [Active]; D-3 [Active]; Simvastatin Oral [Active]; levothyroxine oral [Active];    kr2 

      Xarelto Oral [Active];                                                                      

- PMHx:                                                                                           

19:31 Atrial Fib; Myocardial infarction;                                                      kr2 

- PSHx:                                                                                           

19:31 Double bypass;                                                                          kr2 

                                                                                                  

- Immunization history: Last tetanus immunization: unknown.                                       

- Social history:: Smoking status: Patient/guardian denies using tobacco.                         

- Ebola Screening: : No symptoms or risks identified at this time.                                

                                                                                                  

                                                                                                  

Screenin:28 Abuse screen: Denies threats or abuse. Denies injuries from another. Nutritional        kr2 

      screening: No deficits noted. Tuberculosis screening: No symptoms or risk factors           

      identified. Fall Risk Fall in past 12 months (25 points). Secondary diagnosis (15           

      points) impaired mobility, IV access (20 points). Ambulatory Aid- Crutches/Cane/Walker      

      (15 pts). Gait- Impaired (20 pts.).                                                         

                                                                                                  

Primary Survey:                                                                                   

19:05 Breathing/Chest: Respiratory pattern: regular, Respiratory effort: spontaneous,         kr2 

      unlabored, Breath sounds: clear, bilaterally. Chest inspection: symmetrical rise and        

      fall of the chest. Circulation: Cardiac rhythm: sinus tachycardia. Disability Alert.        

19:53 Reassessment Breathing/Chest Respiratory pattern Regular Respiratory effort Spontaneous kr2 

      Unlabored.                                                                                  

                                                                                                  

Assessment:                                                                                       

19:05 Reassessment: Patient refused CT scan. Provider TRACY Villafana explained the risk of      kr2 

      bleeding on the brain with patient being on Xarelto. Patient verbalized understanding       

      and states, "I have lived a long full life I am ok and understand, I just cant tolerate     

      laying flat for the CT scan and I won't" Patient stated he did not wish to have lab         

      work performed but said he would allow xrays.                                               

19:21 General: Appears in no apparent distress. comfortable, unkempt, well developed, well    kr2 

      nourished, Behavior is calm, cooperative, appropriate for age. Pain: Complains of pain      

      in mid back Pain does not radiate. Pain currently is 0 out of 10 on a pain scale. at        

      worst was 10 out of 10 on a pain scale. Quality of pain is described as sharp, tender,      

      Pain began 30 min ago. Is continuous, Alleviated by rest, Aggravated by increased           

      activity, repositioning. Neuro: Level of Consciousness is awake, alert, obeys commands,     

      Oriented to person, place, time, situation, Appropriate for age. Neuro:  are equal     

      bilaterally Moves all extremities. Speech is normal, Facial symmetry appears normal,        

      Pupils are PERRLA, Intact. EENT: Nares are clear bilaterally Oral mucosa is moist.          

      Cardiovascular: Capillary refill < 3 seconds in bilateral fingers Patient's skin is         

      warm and dry. Rhythm is regular. Respiratory: Airway is patent Respiratory effort is        

      even, unlabored, Respiratory pattern is regular, symmetrical. GI: Abdomen is flat,          

      non-distended. GI: Reports constipation. : No signs and/or symptoms were reported         

      regarding the genitourinary system. Derm: Skin is healthy with good turgor, Rash noted      

      that is red, on breast and abdominal folds. Derm: Musculoskeletal: Circulation, motion,     

      and sensation intact. Range of motion: limited in all extremities. Injury Description:      

      Abrasion sustained to top of head.                                                          

20:30 Reassessment: Patient appears in no apparent distress at this time. Patient and/or      kr2 

      family updated on plan of care and expected duration. Pain level reassessed. Patient is     

      alert, oriented x 3, equal unlabored respirations, skin warm/dry/pink. Patient denies       

      pain at this time.                                                                          

21:30 Reassessment: Patient appears in no apparent distress at this time. Patient and/or      kr2 

      family updated on plan of care and expected duration. Pain level reassessed. Patient is     

      alert, oriented x 3, equal unlabored respirations, skin warm/dry/pink. Patient denies       

      pain at this time.                                                                          

22:00 Reassessment: Patient appears in no apparent distress at this time. Patient and/or      kr2 

      family updated on plan of care and expected duration. Pain level reassessed. Patient is     

      alert, oriented x 3, equal unlabored respirations, skin warm/dry/pink. Patient does not     

      have transportation to go home. Charge nurse aware and is attempting to make                

      arrangements.                                                                               

23:30 Reassessment: No changes from previously documented assessment. Charge nurse spoke with kr2 

      patient regarding a wheelchair van coming to get him to go home if he is able to stand.     

      Patient states he does not think he can stand up and is afraid he will not be able to       

      get up if he gets home. "My wife certainly will not be able to help me up.".                

                                                                                             

00:07 Reassessment: Patient appears in no apparent distress at this time. Patient and/or      kr2 

      family updated on plan of care and expected duration. Pain level reassessed. Patient is     

      alert, oriented x 3, equal unlabored respirations, skin warm/dry/pink. Myself and MOOK Gordon attempted to help patient stand, patient weak and complains of pain with movement.       

      His legs were visibly shaking upon attempt to rise from bed. Patient sat back down and      

      stated, "I just can't, I am too weak." Spoke with Dr. Mcnair and MOOK Rivera charge       

      nurse. Dr. Mcnair ordered labs to be done. Explained to patient and he agreed to          

      having lab work done.                                                                       

02:59 Reassessment: Attempting to take pt upstair he started to complain of right leg pain.   fc  

      Requesting medication. Discussed with Dr Mcnair. Pt to get xray and Tylenol prior to      

      going upstairs.                                                                             

03:35 Reassessment: Dr Mcnair ordered CT of pts leg due to possible fracture of leg. Once   fc  

      CT done pt can have immobilizer put on and taken upstairs.                                  

04:29 Reassessment: Pt taken to 426 via stretcher by staff after Ct Scan. Immobilizer was     fc  

      placed to right leg prior to going up.                                                      

                                                                                                  

Vital Signs:                                                                                      

                                                                                             

19:27  / 78; Pulse 109; Resp 19; Temp 98; Pulse Ox 95% 2 lpm ; Weight 81.65 kg; Height  kr2 

      5 ft. 8 in. (172.72 cm); Pain 0/10;                                                         

20:30  / 85; Pulse 95; Resp 17; Pulse Ox 95% on 2 lpm NC;                               kr2 

21:30  / 89; Pulse 105; Resp 19; Pulse Ox 96% on 2 lpm NC;                              kr2 

22:30  / 71; Pulse 113; Resp 19; Pulse Ox 96% on 2 lpm NC;                              kr2 

                                                                                             

01:06  / 79; Pulse 110; Resp 20; Pulse Ox 95% on 2 lpm NC;                              kr2 

02:52  / 82; Pulse 110; Resp 20; Pulse Ox 96% on R/A;                                   mb3 

                                                                                             

19:27 Body Mass Index 27.37 (81.65 kg, 172.72 cm)                                             kr2 

                                                                                                  

Tye Coma Score:                                                                               

                                                                                             

19:27 Eye Response: spontaneous(4). Verbal Response: oriented(5). Motor Response: obeys       kr2 

      commands(6). Total: 15.                                                                     

                                                                                                  

Trauma Score (Adult):                                                                             

19:27 Eye Response: spontaneous(1); Verbal Response: oriented(1); Motor Response: obeys       kr2 

      commands(2); Systolic BP: > 89 mm Hg(4); Respiratory Rate: 10 to 29 per min(4); Tye     

      Score: 15; Trauma Score: 12                                                                 

                                                                                                  

ED Course:                                                                                        

18:59 Patient arrived in ED.                                                                  ss  

19:05 Manuel Lancaster PA is PHCP.                                                               [id number] 

19:05 Carlo June MD is Attending Physician.                                                [id number] 

19:11 Melvina Montanez, MOOK is Primary Nurse.                                                     kr2 

19:16 Triage completed.                                                                       kr2 

19:29 Arm band placed on right wrist.                                                         kr2 

19:31 Oxygen administration via nasal cannula \T\ 2L/min.                                       kr2 

19:31 Thermoregulation: warm blanket given to patient.                                        kr2 

19:32 Patient has correct armband on for positive identification. Bed in low position. Call   kr2 

      light in reach. Side rails up X2. Cardiac monitor on. Pulse ox on. NIBP on. Door            

      closed. Warm blanket given. Head of bed elevated.                                           

19:43 XRAY Pelvis In Process Unspecified.                                                     EDMS

19:43 XRAY KUB In Process Unspecified.                                                        EDMS

06/07                                                                                             

01:52 Attending Physician role handed off by Carlo June MD                                 constance 

01:52 Santino Mcnair MD is Attending Physician.                                             constance 

02:00 Alex Robledo MD is Hospitalizing Provider.                                           constance 

02:30 Urine Culture Sent.                                                                     mb3 

02:53 No provider procedures requiring assistance completed. Patient admitted, IV remains in  mb3 

      place.                                                                                      

                                                                                                  

Administered Medications:                                                                         

02:21 Drug: Lopressor (metoprolol TARTRATE) 50 mg Route: PO;                                  mb3 

02:55 Follow up: Response: No adverse reaction                                                mb3 

02:22 Drug: NS 0.9% 1000 ml Route: IV; Rate: 75 ml/hr; Site: right forearm;                   mb3 

02:56 Follow up: Response: No adverse reaction; IV Status: Infusion continued upon admission; mb3 

      IV Intake: 75ml                                                                             

03:01 Drug: Tylenol 650 mg Route: PO;                                                         mb3 

03:04 Follow up: Response: No adverse reaction                                                mb3 

                                                                                                  

                                                                                                  

Intake:                                                                                           

01:07 PO: 200ml (Water); Total: 200ml.                                                        kr2 

02:56 IV: 75ml; Total: 275ml.                                                                 mb3 

                                                                                                  

Output:                                                                                           

01:07 Urine: 100ml (Voided); Total: 100ml.                                                    kr2 

                                                                                                  

Outcome:                                                                                          

                                                                                             

21:55 Discharge ordered by MD.                                                                michelle 

06                                                                                             

02:04 Decision to Hospitalize by Provider.                                                    constance 

02:54 Admitted to Tele accompanied by tech, via stretcher, room 426, with chart, Report       mb3 

      called to  Eva Blas RN                                                                    

02:54 Condition: stable                                                                           

02:54 Instructed on the need for admit.                                                           

02:54 Patient's length of stay in the Emergency Department was greater than 2 hours.          mb3 

04:30 Patient left the ED.                                                                    fc  

                                                                                                  

Signatures:                                                                                       

Dispatcher [employer]                           EDMS                                                 

Santino Mcnair MD MD cha Chretien, Felicia, RN RN                                                      

Lyn Navas RN RN ss Roszak, Josh, PA PA   jr8                                                  

Melvina Montanez RN RN   kr2                                                  

Evan Edmond RN                       RN   mb3                                                  

                                                                                                  

Corrections: (The following items were deleted from the chart)                                    

00:10 06 19:05 Reassessment: Patient refused CT scan. Provider TRACY Villafana explained the  kr2 

      risk of bleeding on the brain with patient being on Xarelto. Patient verbalized             

      understanding and states, "I have lived a long full life I am ok and understand, I just     

      cant tolerate laying flat for the CT scan and I won't" kr2                                  

                                                                                             

00:26 00:07 Reassessment: Patient appears in no apparent distress at this time. Patient       kr2 

      and/or family updated on plan of care and expected duration. Pain level reassessed.         

      Patient is alert, oriented x 3, equal unlabored respirations, skin warm/dry/pink.           

      Myself and MOOK Gordon attempted to help patient stand, patient weak and complains of pain     

      with movement. His legs were visibly shaking upon attempt to rise from bed. Patient sat     

      back down and stated, "I just can't, I am too weak." kr2                                    

00:37 [id number] 22:00 Reassessment: Patient appears in no apparent distress at this time. Patient kr2 

      and/or family updated on plan of care and expected duration. Pain level reassessed.         

      Patient is alert, oriented x 3, equal unlabored respirations, skin warm/dry/pink.           

      Patient does not have transportation to go home. Charge nurse aware and is attempting       

      to make arrangement kr2                                                                     

                                                                                             

01:07 01:06  / 79; Pulse 110bpm; Resp 20bpm; Pulse Ox 95% RA; kr2                       kr2 

                                                                                                  

**************************************************************************************************

## 2018-06-06 NOTE — RAD REPORT
EXAM DESCRIPTION:  RAD - Pelvis - 6/6/2018 7:43 pm

 

CLINICAL HISTORY:  Fall, pelvic pain

 

COMPARISON:  None.

 

TECHNIQUE:  AP imaging of the pelvis was obtained.

 

FINDINGS:  No gross fracture deformity of the pelvis. Patient is rotated limiting the ability to asse
ss the superior and inferior pubic rami at the anterior column of the right hip joint. SI joint and p
ubic symphysis degenerative change present. Prominent lumbar spine degenerative changes present but n
ot adequately visualized on this study. No fracture or dislocation of either proximal femur. This exa
m is not considered adequate for full proximal femur assessment.

 

No suspicious soft tissue finding.

 

IMPRESSION:  No gross fracture deformity of the pelvis. Positioning limits lower right pelvis and rolly
ateral proximal femur assessment.

 

Extensive degenerative change lumbar spine with suspected compression fractures of unknown age. This 
exam is not adequate for further assessment.

## 2018-06-07 LAB
ALBUMIN SERPL BCP-MCNC: 3.4 G/DL (ref 3.2–5.5)
ALP SERPL-CCNC: 126 IU/L (ref 42–121)
ALT SERPL W P-5'-P-CCNC: 25 IU/L (ref 10–60)
AST SERPL W P-5'-P-CCNC: 29 IU/L (ref 10–42)
BUN BLD-MCNC: 30 MG/DL (ref 6–20)
CKMB CREATINE KINASE MB: 1.7 NG/ML (ref 0.3–4)
GLUCOSE SERPLBLD-MCNC: 135 MG/DL (ref 65–120)
HCT VFR BLD CALC: 32.3 % (ref 39.6–49)
INR BLD: 1.3
LYMPHOCYTES # SPEC AUTO: 1 K/UL (ref 0.7–4.9)
MAGNESIUM SERPL-MCNC: 2.3 MG/DL (ref 1.8–2.5)
MCH RBC QN AUTO: 34.7 PG (ref 27–35)
MCV RBC: 104.7 FL (ref 80–100)
PMV BLD: 7.9 FL (ref 7.6–11.3)
POTASSIUM SERPL-SCNC: 4.5 MEQ/L (ref 3.6–5)
RBC # BLD: 3.09 M/UL (ref 4.33–5.43)

## 2018-06-07 RX ADMIN — SODIUM CHLORIDE SCH MLS: 0.9 INJECTION, SOLUTION INTRAVENOUS at 21:15

## 2018-06-07 RX ADMIN — METOPROLOL TARTRATE SCH: 25 TABLET ORAL at 17:59

## 2018-06-07 RX ADMIN — Medication SCH ML: at 21:16

## 2018-06-07 RX ADMIN — ATORVASTATIN CALCIUM SCH MG: 10 TABLET, FILM COATED ORAL at 21:15

## 2018-06-07 RX ADMIN — METOPROLOL TARTRATE SCH MG: 25 TABLET ORAL at 05:03

## 2018-06-07 RX ADMIN — SODIUM CHLORIDE SCH MLS: 0.9 INJECTION, SOLUTION INTRAVENOUS at 12:03

## 2018-06-07 RX ADMIN — SODIUM CHLORIDE SCH MLS: 0.9 INJECTION, SOLUTION INTRAVENOUS at 04:53

## 2018-06-07 RX ADMIN — METOPROLOL TARTRATE SCH MG: 25 TABLET ORAL at 17:55

## 2018-06-07 RX ADMIN — Medication SCH ML: at 08:21

## 2018-06-07 NOTE — RAD REPORT
EXAM DESCRIPTION:  RAD - Tib Fib Right - 6/7/2018 2:10 pm

 

CLINICAL HISTORY:  Fall, right leg pain

 

COMPARISON:  None.

 

FINDINGS:  Oblique fracture of the proximal tibial metaphysis is noted. Fracture of the proximal fibu
lar head/ neck also seen. No dislocation evident. Prominent calcaneal spurs. Vascular calcifications 
noted.

## 2018-06-07 NOTE — RAD REPORT
EXAM DESCRIPTION:  RAD - Femur Right - 6/7/2018 2:40 pm

 

CLINICAL HISTORY:  Fall, tibia fracture, leg pain

 

COMPARISON:  CT imaging June 7, 2018, pelvis June 6, 2018

 

FINDINGS:  No proximal femur fracture and no dislocation of the femoral head. No AVN or focal femoral
 head abnormality. No periarticular mass or hematoma. Joint effusion is not suspected. The shaft and 
distal femur also without evidence for fracture. Fluid in the joint space is present. This is detaile
d further on the separate CT knee report. Known proximal tibia and fibula fractures are only partiall
y imaged on this study.

 

Vascular calcifications are present. No periosteal reaction. No air or foreign body in the soft tissu
es.

 

IMPRESSION:  No fracture or acute femur finding.

 

Knee joint and proximal tib-fib findings are detailed in separate CT report.

## 2018-06-07 NOTE — HP
Date of Admission:  06/07/2018



History Of Present Illness:  An 88-year male, who was at home using his walker and all of a sudden he
 lost his balance and fell.  He apparently hit his body on the floor along with his head with furnitu
re; however, he did not lose consciousness.  He had back pain in his low back and his right leg.  He 
came to emergency room and was found to have right tibial fracture.  He was admitted to control pain 
and for orthopedic evaluation and management.



Review of Systems:

Cardiovascular:  No complaints. 

ENT:  No complaints. 

Skeletomuscular:  As above. 

Neurological:  No complaint. 

Gastrointestinal:  Chronic constipation.  No other complaint. 

Genitourinary:  No complaint.



Past Medical History:  

1.Atrial fibrillation, chronic.

2.Heart disease, status by post CABG.

3.Hypothyroidism.

4.Hyperlipidemia.

5.Osteoarthritis, multiple joints.



Social History:  Noncontributing.



Family History:  Noncontributing.



Medications:  Include simvastatin 5 mg p.o. daily, levothyroxine 25 mcg p.o. daily, Xarelto 15 mg p.o
. daily, and vitamin B12 and vitamin D3 supplement.



Allergies:  NO KNOWN DRUG ALLERGIES.



Physical Examination:

General:  The patient is sitting in no acute distress. 

Vital Signs:  His blood pressure 100/70, pulse 100, temperature 98.9. 

Heart:  Regular rate and rhythm.  No bruit. 

Respiratory:  Lungs are clear to auscultation. 

Abdomen:  Soft, benign, and nontender.  Bowel sounds are active. 

Extremities:  The right leg in a splint.  No edema.  No cyanosis. 

Neurological:  The patient is alert and oriented x4.  Nonfocal.  Grossly intact. 

Head and Neck:  The patient had also equal, round, reactive pupils.  Extraocular muscles intact. 



The patient had a KUB x-ray showed nonacute compression fractures in the lumbar spine multiple levels
. 

He had x-ray and CT scan of the right lower extremity showed right tibial fracture with metaphysis an
d which is oblique and no dislocation.



Laboratory Data:  Hemoglobin 10.7, hematocrit 32.3, platelets 132, BUN 30, creatinine 1.22, GFR 56.  
.



Assessment And Plan:  

1.Right tibial fracture.  The patient admitted __________orthopedic consult.  The patient is on morp
liz to control his pain.

2.Confusion, head trauma.  No loss of consciousness.  No neurological deficits.  The patient discuss
ed with him to do a CAT scan because he is on blood thinners, Xarelto.  He is very well aware of that
 and still refused to do any CAT scan, MRI __________ ready, if he is going to pass out with that, bu
t he is not willing to do any of those, make sure the patient has plenty of choices and the risks inv
olved.  We will go ahead and hold on the Xarelto for now.  We will consider on discharge because he i
s high risk for fall to stop his Xarelto and keep him on aspirin and Plavix.  However, we will ask th
e patient to follow up on that with  __________.

3.Constipation.  We will give the patient milk of magnesia for.  We will continue the rest of his me
dications for his chronic medical problems.  Look orders for details.





MFS/MODL

DD:  06/07/2018 11:34:45Voice ID:  [id number]

## 2018-06-07 NOTE — RAD REPORT
EXAM DESCRIPTION:  CT - Lower Ext Wo Con W/ Mpr - 6/7/2018 6:07 am

 

CLINICAL HISTORY:  Fall, leg pain.

 

 A preliminary written report was provided at the time of the study, and the report was reviewed prio
r to final dictation.

 

COMPARISON:  None.

 

TECHNIQUE:  Axial 2 millimeter thick images were obtained of the knee and surrounding soft tissues. S
agittal and coronal reformatted images were generated and reviewed.

 

FINDINGS:  An oblique fracture is present through the proximal tibia metadiaphysis. No distraction or
 angulation deformity. No pathologic changes identifiable. There is a nondisplaced fracture through t
he head of the fibula. No patella tendon or quadriceps tendon injury suspected. ACL and PCL are proba
derek intact as well. CT assessment is more limited. Medial and lateral collateral ligaments are not ac
curately assessed in this level. Fluid is present in the joint space with suspected lipoma hemarthros
is. No tibial plateau fracture identifiable.

 

Osteopenic and degenerative changes are present in the distal femur and patella. No fracture or acute
 component.

 

No significant soft tissue finding.

 

IMPRESSION:  Oblique fracture through the proximal tibia metadiaphysis without significant distractio
n and no impaction or angulation.

 

Nondisplaced fibular head fracture.

 

Small lipoma hemarthrosis suspected.

 

Underlying degenerative change with no pathologic bone process.

## 2018-06-08 LAB
BUN BLD-MCNC: 27 MG/DL (ref 6–20)
GLUCOSE SERPLBLD-MCNC: 86 MG/DL (ref 65–120)
HCT VFR BLD CALC: 30.1 % (ref 39.6–49)
LYMPHOCYTES # SPEC AUTO: 1 K/UL (ref 0.7–4.9)
MCH RBC QN AUTO: 34.3 PG (ref 27–35)
MCV RBC: 105.3 FL (ref 80–100)
PMV BLD: 8.6 FL (ref 7.6–11.3)
POTASSIUM SERPL-SCNC: 4.5 MEQ/L (ref 3.6–5)
RBC # BLD: 2.86 M/UL (ref 4.33–5.43)

## 2018-06-08 RX ADMIN — METOPROLOL TARTRATE SCH: 25 TABLET ORAL at 18:00

## 2018-06-08 RX ADMIN — METOPROLOL TARTRATE SCH: 25 TABLET ORAL at 04:57

## 2018-06-08 RX ADMIN — CHOLECALCIFEROL CAP 125 MCG (5000 UNIT) SCH UNIT: 125 CAP at 08:52

## 2018-06-08 RX ADMIN — ATORVASTATIN CALCIUM SCH MG: 10 TABLET, FILM COATED ORAL at 20:48

## 2018-06-08 RX ADMIN — Medication SCH: at 20:47

## 2018-06-08 RX ADMIN — MAGNESIUM HYDROXIDE PRN ML: 400 SUSPENSION ORAL at 08:52

## 2018-06-08 RX ADMIN — LEVOTHYROXINE SODIUM SCH MG: 112 TABLET ORAL at 04:55

## 2018-06-08 RX ADMIN — SODIUM CHLORIDE SCH MLS: 0.9 INJECTION, SOLUTION INTRAVENOUS at 18:00

## 2018-06-08 RX ADMIN — SODIUM CHLORIDE SCH MLS: 0.9 INJECTION, SOLUTION INTRAVENOUS at 09:08

## 2018-06-08 RX ADMIN — CYANOCOBALAMIN TAB 1000 MCG SCH MCG: 1000 TAB at 08:52

## 2018-06-08 RX ADMIN — Medication SCH: at 08:52

## 2018-06-08 NOTE — PN
Subjective:  The patient's right leg pain and low back pain controlled with pain medication.  Has no 
new complaints.



Objective:  Vital signs:  Blood pressure 100/65, pulse 100, and temperature 98.3. 

Heart:  Regular rate and rhythm. 

Chest:  Clear to auscultation. 

Abdomen:  Soft, benign. 

Neurologic:  Grossly intact. 

Extremities:  No edema.  No cyanosis.



Laboratory Data:  Hemoglobin 9.8, hematocrit 30.1, BUN 27, and creatinine 1.25.



Assessment And Plan:  

1.Right tibial fracture pending orthopedic recommendation.  Continue pain control.

2.Acute renal failure from volume depletion.  We will put the patient on IV fluids.  We will monitor
.

3.Anemia of chronic disease, clinically stable.  We will monitor CBC.

4.Pending social service input, look orders for details.





MFS/MODL

DD:  06/08/2018 11:04:49Voice ID:  [id number]

DT:  06/08/2018 13:24:28Report ID:  [id number]

## 2018-06-08 NOTE — EKG
Test Date:    2018-06-07               Test Time:    02:00:13

Technician:   CMC                                    

                                                     

MEASUREMENT RESULTS:                                       

Intervals:                                           

Rate:         112                                    

OR:                                                  

QRSD:         102                                    

QT:           338                                    

QTc:          461                                    

Axis:                                                

P:                                                   

OR:                                                  

QRS:          -37                                    

T:            93                                     

                                                     

INTERPRETIVE STATEMENTS:                                       

                                                     

Atrial fibrillation with rapid ventricular response

Left axis deviation

Moderate voltage criteria for LVH, may be normal variant

Nonspecific ST and T wave abnormality

Abnormal ECG

Compared to ECG 04/22/2018 17:46:55

Left ventricular hypertrophy now present

ST (T wave) deviation now present



Electronically Signed On 06-08-18 07:20:07 CDT by Francis Nick

## 2018-06-09 LAB
BUN BLD-MCNC: 24 MG/DL (ref 6–20)
GLUCOSE SERPLBLD-MCNC: 90 MG/DL (ref 65–120)
HCT VFR BLD CALC: 30.6 % (ref 39.6–49)
LYMPHOCYTES # SPEC AUTO: 1 K/UL (ref 0.7–4.9)
MCH RBC QN AUTO: 35.6 PG (ref 27–35)
MCV RBC: 103.4 FL (ref 80–100)
PMV BLD: 8.2 FL (ref 7.6–11.3)
POTASSIUM SERPL-SCNC: 4.5 MEQ/L (ref 3.6–5)
RBC # BLD: 2.96 M/UL (ref 4.33–5.43)

## 2018-06-09 RX ADMIN — SODIUM CHLORIDE SCH MLS: 0.9 INJECTION, SOLUTION INTRAVENOUS at 03:35

## 2018-06-09 RX ADMIN — Medication SCH ML: at 09:38

## 2018-06-09 RX ADMIN — LEVOTHYROXINE SODIUM SCH MG: 112 TABLET ORAL at 05:21

## 2018-06-09 RX ADMIN — ENOXAPARIN SODIUM SCH MG: 40 INJECTION SUBCUTANEOUS at 16:40

## 2018-06-09 RX ADMIN — ATORVASTATIN CALCIUM SCH MG: 10 TABLET, FILM COATED ORAL at 20:16

## 2018-06-09 RX ADMIN — METOPROLOL TARTRATE SCH: 25 TABLET ORAL at 05:20

## 2018-06-09 RX ADMIN — CYANOCOBALAMIN TAB 1000 MCG SCH MCG: 1000 TAB at 09:43

## 2018-06-09 RX ADMIN — Medication SCH: at 20:16

## 2018-06-09 RX ADMIN — MAGNESIUM HYDROXIDE PRN ML: 400 SUSPENSION ORAL at 18:42

## 2018-06-09 RX ADMIN — SODIUM CHLORIDE SCH MLS: 0.9 INJECTION, SOLUTION INTRAVENOUS at 23:12

## 2018-06-09 RX ADMIN — Medication SCH ML: at 14:35

## 2018-06-09 RX ADMIN — METOPROLOL TARTRATE SCH: 25 TABLET ORAL at 17:16

## 2018-06-09 RX ADMIN — SODIUM CHLORIDE SCH MLS: 0.9 INJECTION, SOLUTION INTRAVENOUS at 14:32

## 2018-06-09 RX ADMIN — Medication SCH ML: at 09:39

## 2018-06-09 RX ADMIN — Medication SCH: at 17:00

## 2018-06-09 RX ADMIN — CHOLECALCIFEROL CAP 125 MCG (5000 UNIT) SCH UNIT: 125 CAP at 09:39

## 2018-06-09 NOTE — PN
Subjective:  The patient is feeling well.  He had relief of constipation with milk of magnesia and hi
s leg pain is well controlled.  No new complaints.



Objective:  Vital Signs:  Blood pressure 103/57, pulse 82, temperature 97.5. 

Heart:  Regular rate and rhythm. 

Chest:  Clear to auscultation. 

Abdomen:  Soft, benign. 

Neurologic:  Alert, oriented.  Nonfocal.  Grossly intact.



Laboratory Data:  Hemoglobin 10.5, hematocrit 30.6.  BUN 24, creatinine 1.11.



Assessment And Plan:  Right tibial fracture.



Plan:  As per Dr. Orozco conservative at this time.  We will keep him on a splint with no weightbe
aring.  We are waiting on social service recommendation for discharge __________ and whether he will 
go to a nursing home with that Physical Therapy or will be taking care up at home until he follows up
 with Dr. Orozco __________.





GEORGINA/JALEESA

DD:  06/09/2018 14:19:10Voice ID:  [id number]

DT:  06/09/2018 18:54:33Report ID:  [id number]

## 2018-06-10 RX ADMIN — CYANOCOBALAMIN TAB 1000 MCG SCH MCG: 1000 TAB at 09:21

## 2018-06-10 RX ADMIN — SODIUM CHLORIDE SCH MLS: 0.9 INJECTION, SOLUTION INTRAVENOUS at 09:17

## 2018-06-10 RX ADMIN — CHOLECALCIFEROL CAP 125 MCG (5000 UNIT) SCH UNIT: 125 CAP at 09:21

## 2018-06-10 RX ADMIN — Medication SCH: at 12:00

## 2018-06-10 RX ADMIN — Medication SCH ML: at 09:22

## 2018-06-10 RX ADMIN — ENOXAPARIN SODIUM SCH MG: 40 INJECTION SUBCUTANEOUS at 16:39

## 2018-06-10 RX ADMIN — METOPROLOL TARTRATE SCH: 25 TABLET ORAL at 05:56

## 2018-06-10 RX ADMIN — Medication SCH ML: at 16:39

## 2018-06-10 RX ADMIN — METOPROLOL TARTRATE SCH: 25 TABLET ORAL at 16:39

## 2018-06-10 RX ADMIN — Medication SCH: at 09:00

## 2018-06-10 RX ADMIN — SODIUM CHLORIDE SCH MLS: 0.9 INJECTION, SOLUTION INTRAVENOUS at 20:42

## 2018-06-10 RX ADMIN — SODIUM CHLORIDE SCH MLS: 0.9 INJECTION, SOLUTION INTRAVENOUS at 16:38

## 2018-06-10 RX ADMIN — LEVOTHYROXINE SODIUM SCH MG: 112 TABLET ORAL at 05:56

## 2018-06-10 RX ADMIN — Medication SCH: at 20:43

## 2018-06-10 RX ADMIN — ATORVASTATIN CALCIUM SCH MG: 10 TABLET, FILM COATED ORAL at 20:42

## 2018-06-10 NOTE — PN
Subjective:  The patient has no new complaints.



Physical Examination:

Basically no change, although still pending.



Assessment And Plan:  Social service recommendation about discharge to nursing home with physical the
rapy or home with home physical therapy with weightbearing as recommended by orthopedics.  Meanwhile 
continue current treatment and care.





MFS/MODL

DD:  06/10/2018 13:03:36Voice ID:  [id number]

DT:  06/10/2018 17:22:15Report ID:  [id number]

## 2018-06-11 VITALS — OXYGEN SATURATION: 95 %

## 2018-06-11 RX ADMIN — ENOXAPARIN SODIUM SCH MG: 40 INJECTION SUBCUTANEOUS at 17:09

## 2018-06-11 RX ADMIN — CYANOCOBALAMIN TAB 1000 MCG SCH MCG: 1000 TAB at 09:22

## 2018-06-11 RX ADMIN — SODIUM CHLORIDE SCH MLS: 0.9 INJECTION, SOLUTION INTRAVENOUS at 14:15

## 2018-06-11 RX ADMIN — Medication SCH ML: at 08:00

## 2018-06-11 RX ADMIN — METOPROLOL TARTRATE SCH: 25 TABLET ORAL at 05:46

## 2018-06-11 RX ADMIN — LEVOTHYROXINE SODIUM SCH MG: 112 TABLET ORAL at 05:46

## 2018-06-11 RX ADMIN — Medication SCH ML: at 13:01

## 2018-06-11 RX ADMIN — Medication SCH ML: at 17:09

## 2018-06-11 RX ADMIN — CHOLECALCIFEROL CAP 125 MCG (5000 UNIT) SCH UNIT: 125 CAP at 09:22

## 2018-06-11 RX ADMIN — METOPROLOL TARTRATE SCH MG: 25 TABLET ORAL at 17:09

## 2018-06-11 RX ADMIN — Medication SCH ML: at 19:58

## 2018-06-11 RX ADMIN — Medication SCH: at 09:00

## 2018-06-11 RX ADMIN — ATORVASTATIN CALCIUM SCH MG: 10 TABLET, FILM COATED ORAL at 19:58

## 2018-06-11 NOTE — PN
Subjective:  The patient is doing well.  He has no complaints.



Objective:  Vital Signs:  Stable. 

Heart:  Regular rate and rhythm. 

Chest:  Clear to auscultation. 

Abdomen:  Soft, benign. 

Neurologic:  Alert, oriented.  Grossly intact.



Assessment And Plan:  Right pretibial fracture.  Physical Therapy started working with the patient fo
r weightbearing at this time.  We are awaiting on social service arrangements for nursing home placem
ent with PT and OT.  Meanwhile, continue current treatment.





MFS/MODL

DD:  06/11/2018 11:26:30Voice ID:  [id number]

DT:  06/11/2018 13:47:52Report ID:  [id number]

## 2018-06-12 VITALS — TEMPERATURE: 98.5 F | DIASTOLIC BLOOD PRESSURE: 60 MMHG | SYSTOLIC BLOOD PRESSURE: 91 MMHG

## 2018-06-12 LAB
BUN BLD-MCNC: 24 MG/DL (ref 6–20)
GLUCOSE SERPLBLD-MCNC: 96 MG/DL (ref 65–120)
POTASSIUM SERPL-SCNC: 4.7 MEQ/L (ref 3.6–5)

## 2018-06-12 RX ADMIN — Medication SCH ML: at 09:12

## 2018-06-12 RX ADMIN — LEVOTHYROXINE SODIUM SCH MG: 112 TABLET ORAL at 05:42

## 2018-06-12 RX ADMIN — CHOLECALCIFEROL CAP 125 MCG (5000 UNIT) SCH UNIT: 125 CAP at 09:12

## 2018-06-12 RX ADMIN — Medication SCH ML: at 11:43

## 2018-06-12 RX ADMIN — Medication SCH ML: at 08:00

## 2018-06-12 RX ADMIN — METOPROLOL TARTRATE SCH: 25 TABLET ORAL at 05:42

## 2018-06-12 RX ADMIN — CYANOCOBALAMIN TAB 1000 MCG SCH MCG: 1000 TAB at 09:12

## 2018-06-18 ENCOUNTER — HOSPITAL ENCOUNTER (INPATIENT)
Dept: HOSPITAL 97 - ER | Age: 83
LOS: 4 days | Discharge: NURSING, REHAB OR LONG-TERM CARE | DRG: 392 | End: 2018-06-22
Attending: INTERNAL MEDICINE | Admitting: INTERNAL MEDICINE
Payer: COMMERCIAL

## 2018-06-18 VITALS — BODY MASS INDEX: 28.4 KG/M2

## 2018-06-18 DIAGNOSIS — I10: ICD-10-CM

## 2018-06-18 DIAGNOSIS — Z79.82: ICD-10-CM

## 2018-06-18 DIAGNOSIS — K52.89: Primary | ICD-10-CM

## 2018-06-18 DIAGNOSIS — E03.9: ICD-10-CM

## 2018-06-18 DIAGNOSIS — K59.00: ICD-10-CM

## 2018-06-18 DIAGNOSIS — I48.91: ICD-10-CM

## 2018-06-18 DIAGNOSIS — S82.101D: ICD-10-CM

## 2018-06-18 DIAGNOSIS — E78.5: ICD-10-CM

## 2018-06-18 DIAGNOSIS — S82.831D: ICD-10-CM

## 2018-06-18 DIAGNOSIS — Z79.02: ICD-10-CM

## 2018-06-18 LAB
ALBUMIN SERPL BCP-MCNC: 3 G/DL (ref 3.2–5.5)
ALP SERPL-CCNC: 204 IU/L (ref 42–121)
ALT SERPL W P-5'-P-CCNC: 28 IU/L (ref 10–60)
AST SERPL W P-5'-P-CCNC: 36 IU/L (ref 10–42)
BUN BLD-MCNC: 25 MG/DL (ref 6–20)
GLUCOSE SERPLBLD-MCNC: 108 MG/DL (ref 65–120)
HCT VFR BLD CALC: 35.1 % (ref 39.6–49)
INR BLD: 1.14
LIPASE SERPL-CCNC: 22 U/L (ref 22–51)
LYMPHOCYTES # SPEC AUTO: 1.5 K/UL (ref 0.7–4.9)
MCH RBC QN AUTO: 35 PG (ref 27–35)
MCV RBC: 102.8 FL (ref 80–100)
PMV BLD: 7 FL (ref 7.6–11.3)
POTASSIUM SERPL-SCNC: 5.1 MEQ/L (ref 3.6–5)
RBC # BLD: 3.42 M/UL (ref 4.33–5.43)

## 2018-06-18 PROCEDURE — 85610 PROTHROMBIN TIME: CPT

## 2018-06-18 PROCEDURE — 81015 MICROSCOPIC EXAM OF URINE: CPT

## 2018-06-18 PROCEDURE — 85025 COMPLETE CBC W/AUTO DIFF WBC: CPT

## 2018-06-18 PROCEDURE — 83690 ASSAY OF LIPASE: CPT

## 2018-06-18 PROCEDURE — 97163 PT EVAL HIGH COMPLEX 45 MIN: CPT

## 2018-06-18 PROCEDURE — 81003 URINALYSIS AUTO W/O SCOPE: CPT

## 2018-06-18 PROCEDURE — 86901 BLOOD TYPING SEROLOGIC RH(D): CPT

## 2018-06-18 PROCEDURE — 74178 CT ABD&PLV WO CNTR FLWD CNTR: CPT

## 2018-06-18 PROCEDURE — 96365 THER/PROPH/DIAG IV INF INIT: CPT

## 2018-06-18 PROCEDURE — 36415 COLL VENOUS BLD VENIPUNCTURE: CPT

## 2018-06-18 PROCEDURE — 80053 COMPREHEN METABOLIC PANEL: CPT

## 2018-06-18 PROCEDURE — 80048 BASIC METABOLIC PNL TOTAL CA: CPT

## 2018-06-18 PROCEDURE — 86900 BLOOD TYPING SEROLOGIC ABO: CPT

## 2018-06-18 PROCEDURE — 86850 RBC ANTIBODY SCREEN: CPT

## 2018-06-18 PROCEDURE — 93005 ELECTROCARDIOGRAM TRACING: CPT

## 2018-06-18 PROCEDURE — 99285 EMERGENCY DEPT VISIT HI MDM: CPT

## 2018-06-19 LAB — UA COMPLETE W REFLEX CULTURE PNL UR: (no result)

## 2018-06-19 RX ADMIN — Medication SCH: at 09:00

## 2018-06-19 RX ADMIN — SODIUM CHLORIDE SCH MLS: 0.9 INJECTION, SOLUTION INTRAVENOUS at 22:26

## 2018-06-19 RX ADMIN — Medication SCH ML: at 22:23

## 2018-06-19 RX ADMIN — ATORVASTATIN CALCIUM SCH MG: 10 TABLET, FILM COATED ORAL at 20:31

## 2018-06-19 RX ADMIN — SODIUM CHLORIDE SCH MLS: 0.9 INJECTION, SOLUTION INTRAVENOUS at 12:25

## 2018-06-19 RX ADMIN — SODIUM CHLORIDE SCH MLS: 0.9 INJECTION, SOLUTION INTRAVENOUS at 03:39

## 2018-06-19 NOTE — RAD REPORT
EXAM DESCRIPTION:  CT - Abdomen   Pelvis W/Wo Contrast - 6/19/2018 5:24 am

 

CLINICAL HISTORY:  Abdominal pain, GI bleed

 

 A preliminary written report was provided at the time of the study, and the report was reviewed prio
r to final dictation.

 

COMPARISON:  CT chest abdomen and pelvis imaging April 2018

 

TECHNIQUE:  Precontrast imaging of the abdomen and pelvis performed. Biphasic, helical CT imaging of 
the abdomen and pelvis was performed following 100 ml non-ionic IV contrast. Oral contrast was given.


 

All CT scans are performed using dose optimization technique as appropriate and may include automated
 exposure control or mA/KV adjustment according to patient size.

 

FINDINGS:  Small bilateral pleural effusions are present. These are new from April. Hiatal hernia is 
present with most of the stomach intrathoracic and positioned in the medial posterior right base. Car
diomegaly is present and stable. No pericardial thickening or effusion. Cardiac enlargement is primar
fariba right atrium.

 

The liver, spleen, and pancreas show no suspicious findings. Gallbladder is partially contracted. Mot
ion limits detail. Acute gallbladder process is doubtful. No biliary tree dilatation.

 

Symmetric renal function is seen with no hydronephrosis or suspicious renal mass. No pyelonephritis o
r acute renal parenchymal process. A 3 centimeter posterior lower pole right renal cyst is present. P
artially filled urinary bladder shows no suspicious finding. Prostate gland and seminal vesicles are 
normal range.

 

No wall thickening or mass within the herniated portion of the stomach. Gastric wall detail is limite
d in the setting of hernia. intraabdominal portion of the stomach is unremarkable. Small bowel loops 
are not dilated. No acute small bowel finding. Appendix is not clearly defined. No direct or indirect
 evidence to suspect appendicitis. Moderate stool volume is present. There is a large stool volume di
lating the rectum. Walls of the rectum are slightly thickened for this degree of dilation. There is s
tranding in the perirectal fat.

 

No free air, free fluid or pneumatosis. No other area of inflammatory stranding.  No hernia, mass or 
bulky lymphadenopathy. Surgical clips are present from prior inguinal hernia repair. No adrenal abnor
mality.

 

Extensive bony degenerative changes are present. Significant disc degenerative change present as well
. There is partial compression of all lumbar vertebrae similar to April. Pathologic bone process is n
ot suspected.

 

 

IMPRESSION:  No bowel obstruction, free air or surgically emergent finding.

 

Large stool volume dilating the rectum with wall thickening and perirectal stranding. Proctitis/ rect
al colitis is suspected. Rectal changes are possibly reactive from the large stool volume.

 

Moderate stool volume elsewhere in the colon without acute colon, small bowel or gastric finding.

 

Additional nonacute findings detailed above similar to comparison.

## 2018-06-19 NOTE — RAD REPORT
EXAM DESCRIPTION:  RAD - Knee Right 2 View - 6/19/2018 7:49 pm

 

CLINICAL HISTORY:  Right knee pain

 

FINDINGS:  Subacute nondisplaced fractures of the proximal tibia diametaphysis and fibular neck are n
oted.

 

A small joint effusion is present. No dislocation is seen

## 2018-06-19 NOTE — ER
Nurse's Notes                                                                                     

 [facility]                                                                

Name: Jos Aguayo Jr                                                                              

Age: 88 yrs                                                                                       

Sex: Male                                                                                         

: 1929                                                                                   

MRN: [id number]                                                                                   

Arrival Date: 2018                                                                          

Time: 21:02                                                                                       

Account#: [id number]                                                                            

Bed 8                                                                                             

Private MD:                                                                                       

Diagnosis: Rectal Bleed, Stercoral Colitis, Fecal Impaction, Atrial fibrillation with RVR         

                                                                                                  

Presentation:                                                                                     

                                                                                             

21:13 Presenting complaint: EMS states: Per nursing home staff, patient had large amount of   lp1 

      bright red blood from rectum about an hour prior to arrival; Denies any pain, no Hx of      

      bleeding, on Plavix. Transition of care: patient was received from another setting of       

      care (long-term care facility), [facility]. Onset of symptoms was 2018 at 20:00. Risk Assessment: Do you want to hurt yourself or someone else? Patient       

      reports no desire to harm self or others. Initial Sepsis Screen: Does the patient meet      

      any 2 criteria? No. Patient's initial sepsis screen is negative. Does the patient have      

      a suspected source of infection? No. Patient's initial sepsis screen is negative. Care      

      prior to arrival: None.                                                                     

21:13 Method Of Arrival: EMS: [facility]                                                       lp1 

21:13 Acuity: LM 2                                                                           lp1 

                                                                                                  

Triage Assessment:                                                                                

21:23 General: Dr. Parson at bedside to confirm gross blood in patient's brief from rectum.   lp1 

                                                                                                  

Historical:                                                                                       

- Allergies:                                                                                      

21:23 No Known Allergies;                                                                     lp1 

- Home Meds:                                                                                      

21:23 Vitamin B-12 Oral 2500 IU daily [Active]; Vitamin D3 5,000 unit oral tab daily          lp1 

      [Active]; Plavix 75 mg Oral tab 1 tab once daily [Active]; simvastatin 20 mg Oral tab 1     

      tab once daily [Active]; aspirin 81 mg Oral TbEC 1 tab once daily [Active];                 

      levothyroxine 112 mcg tab 1 tab once daily [Active]; benzonatate 100 mg oral cap every      

      6 hours for Cough [Active]; acetaminophen 325 mg Oral tab 2 tabs every 6 hours for          

      Fever, Pain [Active]; Milk of Magnesia 400 mg/5 mL Oral susp 30 mL once daily for           

      Constipation [Active];                                                                      

- PMHx:                                                                                           

21:23 Atrial Fib; Myocardial infarction; Osteoporosis; Hyperlipidemia; Hypertension;          lp1 

      Hypothyroidism;                                                                             

- PSHx:                                                                                           

21:23 Hernia repair; Bypass;                                                                  lp1 

                                                                                                  

- Immunization history:: Adult Immunizations up to date.                                          

- Social history:: Smoking status: Patient/guardian denies using tobacco.                         

- Ebola Screening: : No symptoms or risks identified at this time.                                

                                                                                                  

                                                                                                  

Screenin:24 Abuse screen: Denies threats or abuse. Denies injuries from another. Nutritional        lp1 

      screening: No deficits noted. Tuberculosis screening: No symptoms or risk factors           

      identified. Fall Risk Total Delgado Fall Scale indicates High Risk Score (45 or more          

      points). Fall prevention measures have been instituted. Side Rails Up X 2 As available      

      patient and family educated on Fall Prevention Program and Strategies.                      

                                                                                                  

Assessment:                                                                                       

21:31 General: Appears in no apparent distress. Behavior is calm, cooperative, appropriate    ea  

      for age. Pain: Complains of pain in right leg Pain does not radiate. Pain currently is      

      7 out of 10 on a pain scale. Quality of pain is described as aching. Neuro: Level of        

      Consciousness is awake, alert, obeys commands, Oriented to person, place, time,             

      situation. Cardiovascular: Heart tones S1 S2 present Patient's skin is warm and dry.        

      Respiratory: Airway is patent Respiratory effort is even, unlabored, Respiratory            

      pattern is regular, symmetrical. GI: Abdomen is non-distended, Bowel sounds present X 4     

      quads. Abd is soft X 4 quads Abdomen is tender to palpation in right lower quadrant and     

      left lower quadrant. : No signs and/or symptoms were reported regarding the               

      genitourinary system. EENT: No signs and/or symptoms were reported regarding the EENT       

      system. Derm: Skin is pink, warm \T\ dry. Musculoskeletal: Reports Recently had a right     

      tibia fracture.                                                                             

21:36 Reassessment: Daughter at bedside reported she would be leaving to check on her mother. ea  

      Home phone: [phone] and cell [phone].                                                 

22:25 Reassessment: Patient and/or family updated on plan of care and expected duration. Pain ea  

      level reassessed. Patient is alert, oriented x 3, equal unlabored respirations, skin        

      warm/dry/pink.                                                                              

23:24 Reassessment: Patient and/or family updated on plan of care and expected duration. Pain ea  

      level reassessed. Patient is alert, oriented x 3, equal unlabored respirations, skin        

      warm/dry/pink. Patient denies pain at this time.                                            

                                                                                             

00:25 Reassessment: Patient and/or family updated on plan of care and expected duration. Pain lp1 

      level reassessed. Patient is alert, oriented x 3, equal unlabored respirations, skin        

      warm/dry/pink. Patient states symptoms have improved.                                       

01:09 Reassessment: Patient and/or family updated on plan of care and expected duration. Pain ea  

      level reassessed. Patient is alert, oriented x 3, equal unlabored respirations, skin        

      warm/dry/pink. Patient states symptoms have improved.                                       

02:22 Reassessment: Patient and/or family updated on plan of care and expected duration. Pain ea  

      level reassessed. Pt resting with eyes closed, respirations even and unlabored, chest       

      expansions even and symmetrical. No s/s of pain or discomfort noted at this time.           

03:00 Reassessment: Patient and/or family updated on plan of care and expected duration. Pain ea  

      level reassessed. Patient is alert, oriented x 3, equal unlabored respirations, skin        

      warm/dry/pink. Report called to Brooke DELVALLE on fourth floor.                                   

                                                                                                  

Vital Signs:                                                                                      

                                                                                             

21:16  / 83; Pulse 118; Resp 20; Temp 98.2(O); Pulse Ox 98% on R/A; Weight 83.01 kg;    lp1 

      Height 5 ft. 7 in. (170.18 cm); Pain 0/10;                                                  

22:25  / 86; Pulse 84; Resp 18; Pulse Ox 100% on R/A;                                   mg2 

23:13  / 62; Pulse 98; Resp 18; Pulse Ox 98% on R/A;                                    ea  

                                                                                             

00:26  / 89; Pulse 99; Resp 18; Pulse Ox 98% ; Pain 0/10;                               lp1 

01:33 BP 90 / 68; Pulse 90; Resp 17; Pulse Ox 100% on R/A; Pain 0/10;                         mg2 

02:40 BP 96 / 72; Pulse 80; Resp 18; Pulse Ox 100% on R/A; Pain 0/10;                         mg2 

03:02  / 75; Pulse 82; Resp 18; Pulse Ox 99% on R/A; Pain 0/10;                         ea  

                                                                                             

21:16 Body Mass Index 28.66 (83.01 kg, 170.18 cm)                                             lp1 

                                                                                                  

ED Course:                                                                                        

                                                                                             

21:02 Patient arrived in ED.                                                                  ds1 

21:02 Adam Parson MD is Attending Physician.                                             ps1 

21:08 Radiology exam delayed due to lab results not completed at this time. (BUN/Creatinine). kw1 

21:16 Triage completed.                                                                       lp1 

21:16 Arm band placed on right wrist.                                                         lp1 

21:24 Patient has correct armband on for positive identification. Placed in gown. Bed in low  lp1 

      position. Call light in reach. Side rails up X2. Cardiac monitor on. Pulse ox on. NIBP      

      on.                                                                                         

21:25 Ines Bullock, RN is Primary Nurse.                                                    ea  

21:27 Inserted saline lock: 18 gauge in right antecubital area, using aseptic technique.      mg2 

      Blood collected.                                                                            

21:36 Radiology exam delayed due to lab results not completed at this time. (BUN/Creatinine). nj  

21:41 Cleaned of incontinence. brief changed, BM noted with bright red blood.                 lp1 

21:53 Patient moved to CT via stretcher.                                                      nj  

22:05 CT Abd/Pelvis- W/WO Contrast In Process Unspecified.                                    EDMS

22:25 Lab(s) recollected, by me, sent to lab.                                                 mg2 

06                                                                                             

00:38 Cleaned of incontinence. Linen changed. manual removal of fecal impaction done by the   mg2 

      provider. large amount of brown semi-formed stool noted.                                    

02:30 Alex Robledo MD is Hospitalizing Provider.                                           ps1 

03:01 No provider procedures requiring assistance completed. Patient admitted, IV remains in  ea  

      place.                                                                                      

                                                                                                  

Administered Medications:                                                                         

00:13 Drug: Fleet Enema 133 ml Route: NV;                                                     mg2 

00:40 Follow up: Response: No adverse reaction; Marked relief of symptoms                     ea  

00:26 Drug: Flagyl 500 mg Volume: 100 ml; Route: IVPB; Rate: 200 ml/hr; Infused Over: 30      lp1 

      mins; Site: right antecubital;                                                              

01:40 Follow up: Response: No adverse reaction; IV Status: Completed infusion; IV Intake:     ea  

      100ml                                                                                       

00:27 Drug: Cipro 400 mg Volume: 200 ml; Route: IVPB; Infused Over: 60 mins; Site: right      lp1 

      antecubital;                                                                                

02:00 Follow up: Response: No adverse reaction; IV Status: Completed infusion; IV Intake:     ea  

      200ml                                                                                       

                                                                                                  

                                                                                                  

Intake:                                                                                           

01:40 IV: 100ml; Total: 100ml.                                                                ea  

02:00 IV: 200ml; Total: 300ml.                                                                ea  

                                                                                                  

Outcome:                                                                                          

02:31 Decision to Hospitalize by Provider.                                                    ps1 

03:01 Admitted to Med/surg accompanied by tech, via stretcher, room 413, Report called to     yordy Cox RN                                                                                    

03:01 Condition: stable                                                                           

03:01 Instructed on the need for admit.                                                           

03:35 Patient left the ED.                                                                    yordy  

                                                                                                  

Signatures:                                                                                       

Dispatcher [employer]                           EDMS                                                 

Jaimie Barahona                                ds1                                                  

Barbara Mcgee, RN                         RN   lp1                                                  

Theo Villalba Elena, RN                      RN   Adam Hollingsworth MD MD   ps1                                                  

Ingrid Reinoso                            kw1                                                  

Jeff Candelario RN                    RN   mg2                                                  

                                                                                                  

**************************************************************************************************

## 2018-06-19 NOTE — HP
Date of Admission:  06/19/2018



History Of Present Illness:  An 88-year-old male who recently encountered the right tibial fracture, 
for which also he was discharged to nursing home for PT and OT and weightbearing, came back from the 
nursing home with a complaint that he noticed bright red bleeding per rectum and that he has been con
stipated for about a week.  He had no abdominal pain.  He said some of the discomfort with his consti
pation.  The patient also is on aspirin and Plavix for chronic atrial fibrillation.  He was on Xarelt
o, but because of his increased risk of falling, we changed that.  The patient had no fever.  No chil
ls.  Voiced no other complaints.



Review of Systems:

Gastrointestinal:  As above. 

Cardiovascular:  No complaints. 

Genitourinary:  No complaints. 

Skeletomuscular:  No complaint. 

Neurological:  No complaint.



Past Medical History:  Kindly look at his recent admit and discharge notes.



Social History:  Kindly look at his recent admit and discharge notes.



Family History:  Kindly look at his recent admit and discharge notes.



Medications:  Kindly look at his recent admit and discharge notes.



Allergies:  KINDLY LOOK AT HIS RECENT ADMIT AND DISCHARGE NOTES.



Physical Examination:

Vital Signs:  Blood pressure 100/55, pulse 74, temperature 96.6. 

Heart:  Regular rate and rhythm. 

Chest:  Clear to auscultation. 

Abdomen:  Soft, benign.  No rigidity.  No rebound.  Bowel sounds are normoactive. 

Extremities:  No edema.  No cyanosis.  Peripheral pulses are felt. 

Neurological:  Alert, oriented, nonfocal.  Grossly intact.



Diagnostic Data:  Abdominal and pelvic CT showed no bowel obstruction.  However, the patient had larg
e stool volume along with thickening of the perirectal area, colitis, proctitis is considered.  Elect
rocardiogram, atrial fibrillation with PVCs.  Rate was 93.  CBC; hemoglobin 12, hematocrit 35.1, plat
elets 372, white cell count 7.2.  Potassium 5.1, BUN 25, creatinine 1.18, lipase 22.



Assessment And Plan:  Rectal bleeding with constipation, possible colitis, proctitis.  The patient is
 being admitted after being digitally disimpacted in the emergency room.  We will put him back on mil
k of magnesia.  We will stop any pain medication including morphine because this will have caused wor
sening constipation and the patient is not in pain right now.  We will hold on the aspirin and Plavix
 after Dr. Mcfarland, Gastroenterology evaluates his rectal bleeding.  We will monitor the patient's C
BC and chemistry.  We will continue the rest of his home medicines.  Look orders for details.





GEORGINA/JALEESA

DD:  06/19/2018 10:27:38Voice ID:  [id number]

## 2018-06-19 NOTE — EKG
Test Date:    2018-06-18               Test Time:    22:46:32

Technician:                                       

                                                     

MEASUREMENT RESULTS:                                       

Intervals:                                           

Rate:         93                                     

TX:                                                  

QRSD:         100                                    

QT:           378                                    

QTc:          469                                    

Axis:                                                

P:                                                   

TX:                                                  

QRS:          -38                                    

T:            71                                     

                                                     

INTERPRETIVE STATEMENTS:                                       

                                                     

Atrial fibrillation with premature ventricular or aberrantly conducted

complexes

Left axis deviation

Cannot rule out Anterior infarct, age undetermined

Abnormal ECG

Compared to ECG 06/07/2018 02:00:13

Ventricular premature complex(es) now present

Myocardial infarct finding now present

Left ventricular hypertrophy no longer present

ST (T wave) deviation no longer present



Electronically Signed On 06-19-18 09:27:45 CDT by Francis Nick

## 2018-06-19 NOTE — CON
Date of Consultation:  06/19/2018



History Of Present Illness:  The patient was recently seen by me when he was admitted.  He unfortunat
ely was discharged and underwent problems with rectal bleeding.  He was therefore readmitted.  I am c
onsulted as he did an appointment to see me today in my office; however, he obviously did not attend 
that appointment because of his admission.



Physical Examination:

He still has the immobilizer on.  He does not really have any much distal swelling.  There does not a
ppear to be any problems with the immobilizer. 



He does not have any x-rays done of his knee.  I asked him and he said me that he did have them done 
yesterday; however, on the computer, there is no evidence of this.  At this point, I think we will si
karine require a 2-view x-ray of his knee to ensure he is not having problems, which would necessitate 
cast or operative intervention.  I do not think that this will occur; however, we will need to review
 that.





MATT

DD:  06/19/2018 16:38:05Voice ID:  [id number]

DT:  06/19/2018 16:57:13Report ID:  [id number]

## 2018-06-19 NOTE — EDPHYS
Physician Documentation                                                                           

 [facility]                                                                

Name: Jos Aguayo Jr                                                                              

Age: 88 yrs                                                                                       

Sex: Male                                                                                         

: 1929                                                                                   

MRN: [id number]                                                                                   

Arrival Date: 2018                                                                          

Time: 21:02                                                                                       

Account#: [id number]                                                                            

Bed 8                                                                                             

Private MD:                                                                                       

ED Physician Adam Parson                                                                      

HPI:                                                                                              

                                                                                             

21:17 This 88 yrs old  Male presents to ER via EMS with complaints of GI Bleed.      ps1 

21:17 Onset of symptoms was today. Has BRBPR. No hx of GI bleed before in past. Patient has   ps1 

      been at [facility] for right tibia fracture. Hx of afib and was on xarelto. He has      

      been transitioned to ASA and plavix per NH. No abdominal pain. Hx a hx of chronic back      

      pain and constipation that he states is unchanged and not associated with his symptoms.     

      No other symptoms. .                                                                        

21:24 Cards. Roopa Hemphill..                                                        ps1 

                                                                                                  

Historical:                                                                                       

- Allergies:                                                                                      

21:23 No Known Allergies;                                                                     lp1 

- Home Meds:                                                                                      

21:23 Vitamin B-12 Oral 2500 IU daily [Active]; Vitamin D3 5,000 unit oral tab daily          lp1 

      [Active]; Plavix 75 mg Oral tab 1 tab once daily [Active]; simvastatin 20 mg Oral tab 1     

      tab once daily [Active]; aspirin 81 mg Oral TbEC 1 tab once daily [Active];                 

      levothyroxine 112 mcg tab 1 tab once daily [Active]; benzonatate 100 mg oral cap every      

      6 hours for Cough [Active]; acetaminophen 325 mg Oral tab 2 tabs every 6 hours for          

      Fever, Pain [Active]; Milk of Magnesia 400 mg/5 mL Oral susp 30 mL once daily for           

      Constipation [Active];                                                                      

- PMHx:                                                                                           

21:23 Atrial Fib; Myocardial infarction; Osteoporosis; Hyperlipidemia; Hypertension;          lp1 

      Hypothyroidism;                                                                             

- PSHx:                                                                                           

21:23 Hernia repair; Bypass;                                                                  lp1 

                                                                                                  

- Immunization history:: Adult Immunizations up to date.                                          

- Social history:: Smoking status: Patient/guardian denies using tobacco.                         

- Ebola Screening: : No symptoms or risks identified at this time.                                

                                                                                                  

                                                                                                  

ROS:                                                                                              

21:17 Constitutional: Negative for fever, chills, and weight loss, Eyes: Negative for injury, ps1 

      pain, redness, and discharge, ENT: Negative for injury, pain, and discharge,                

      Cardiovascular: Negative for chest pain, palpitations, and edema, Respiratory: Negative     

      for shortness of breath, cough, wheezing, and pleuritic chest pain, : Negative for        

      injury, bleeding, discharge, and swelling, Skin: Negative for injury, rash, and             

      discoloration, Neuro: Negative for headache, weakness, numbness, tingling, and seizure.     

21:17 Abdomen/GI: Positive for rectal bleeding.                                                   

21:17 MS/extremity: Positive for decreased range of motion, pain, of the right leg.               

                                                                                                  

Exam:                                                                                             

21:17 Constitutional:  This is a well developed, well nourished patient who is awake, alert,  ps1 

      and in no acute distress. Head/Face:  Normocephalic, atraumatic. Eyes:  Pupils equal        

      round and reactive to light, extra-ocular motions intact.  Lids and lashes normal.          

      Conjunctiva and sclera are non-icteric and not injected. Chest/axilla:  Normal chest        

      wall appearance and motion.  Nontender with no deformity.  No lesions are appreciated.      

      Cardiovascular:  Regular rate and rhythm.  No gallops, murmurs, or rubs.  Normal PMI,       

      no JVD.  No pulse deficits. Respiratory:  Lungs have equal breath sounds bilaterally,       

      clear to auscultation and percussion.  No rales, rhonchi or wheezes noted.  No              

      increased work of breathing, no retractions or nasal flaring. Abdomen/GI:  Soft,            

      non-tender, with normal bowel sounds.  No distension or tympany.  No guarding or            

      rebound.  No evidence of tenderness throughout. Male :  Normal genitalia with no          

      discharge or lesions.                                                                       

21:17 Neuro:  Awake and alert, GCS 15, oriented to person, place, time, and situation.            

      Cranial nerves II-XII grossly intact. Sensory grossly intact. Psych:  Awake, alert,         

      with orientation to person, place and time.  Behavior, mood, and affect are within          

      normal limits.                                                                              

21:17 Abdomen/GI: Rectal exam: Stool: grossly bloody, guaiac positive.                            

21:17 Musculoskeletal/extremity: Patient has leg immobilizer splint on right leg. Pain with       

      movement. Unchanged from previous evaluation. .                                             

                                                                                                  

Vital Signs:                                                                                      

21:16  / 83; Pulse 118; Resp 20; Temp 98.2(O); Pulse Ox 98% on R/A; Weight 83.01 kg;    lp1 

      Height 5 ft. 7 in. (170.18 cm); Pain 0/10;                                                  

22:25  / 86; Pulse 84; Resp 18; Pulse Ox 100% on R/A;                                   mg2 

23:13  / 62; Pulse 98; Resp 18; Pulse Ox 98% on R/A;                                    ea  

                                                                                             

00:26  / 89; Pulse 99; Resp 18; Pulse Ox 98% ; Pain 0/10;                               lp1 

01:33 BP 90 / 68; Pulse 90; Resp 17; Pulse Ox 100% on R/A; Pain 0/10;                         mg2 

02:40 BP 96 / 72; Pulse 80; Resp 18; Pulse Ox 100% on R/A; Pain 0/10;                         mg2 

03:02  / 75; Pulse 82; Resp 18; Pulse Ox 99% on R/A; Pain 0/10;                         ea  

                                                                                             

21:16 Body Mass Index 28.66 (83.01 kg, 170.18 cm)                                             lp1 

                                                                                                  

Procedures:                                                                                       

00:03 Performed fecal disimpaction.                                                           ps1 

                                                                                                  

MDM:                                                                                              

                                                                                             

21:04 Patient medically screened.                                                             [facility] 

                                                                                             

02:31 Data reviewed: vital signs, nurses notes, lab test result(s), radiologic studies, CT    ps1 

      scan. ED course: Patient improved after fecal disimpaction. Still has BRBPR. Per CT         

      will admit and have GI consult for resolution. Abx started empirically. .                   

                                                                                                  

                                                                                             

21:05 Order name: CBC with Diff; Complete Time: 21:51                                         [facility] 

                                                                                             

21:05 Order name: Creatinine for Radiology; Complete Time: 22:19                              [facility] 

                                                                                             

21:05 Order name: Lipase; Complete Time: 22:19                                                [facility] 

                                                                                             

21:05 Order name: Urine Microscopic Only; Complete Time: 02:32                                [facility] 

                                                                                             

21:05 Order name: CMP; Complete Time: 22:19                                                   [facility] 

                                                                                             

21:05 Order name: PT-INR; Complete Time: 21:56                                                [facility] 

                                                                                             

21:05 Order name: CT Abd/Pelvis- W/WO Contrast                                                [facility] 

                                                                                             

21:27 Order name: Type And Screen; Complete Time: 00:08                                       [facility] 

                                                                                             

00:22 Order name: CONS Physician Consult                                                      [facility]

                                                                                             

00:58 Order name: Urine Dipstick--Ancillary (enter results)                                   rg2 

                                                                                             

01:29 Order name: Urine Dipstick-Ancillary; Complete Time: 02:32                              [facility]

                                                                                             

21:05 Order name: IV Saline Lock; Complete Time: 21:26                                        ps1 

                                                                                             

21:05 Order name: Labs collected and sent; Complete Time: 21:25                               ps1 

                                                                                             

21:05 Order name: Urine Dipstick-Ancillary (obtain specimen); Complete Time: 01:32            ps1 

                                                                                             

22:37 Order name: EKG - Nurse/Tech; Complete Time: 22:50                                      ps1 

                                                                                                  

Administered Medications:                                                                         

00:13 Drug: Fleet Enema 133 ml Route: IN;                                                     mg2 

00:40 Follow up: Response: No adverse reaction; Marked relief of symptoms                     ea  

00:26 Drug: Flagyl 500 mg Volume: 100 ml; Route: IVPB; Rate: 200 ml/hr; Infused Over: 30      lp1 

      mins; Site: right antecubital;                                                              

01:40 Follow up: Response: No adverse reaction; IV Status: Completed infusion; IV Intake:     ea  

      100ml                                                                                       

00:27 Drug: Cipro 400 mg Volume: 200 ml; Route: IVPB; Infused Over: 60 mins; Site: right      lp1 

      antecubital;                                                                                

02:00 Follow up: Response: No adverse reaction; IV Status: Completed infusion; IV Intake:     ea  

      200ml                                                                                       

                                                                                                  

                                                                                                  

Disposition:                                                                                      

18 02:31 Hospitalization ordered by Alex Robledo for Inpatient Admission. Preliminary     

  diagnosis is Rectal Bleed, Stercoral Colitis, Fecal Impaction, Atrial                           

  fibrillation with RVR.                                                                          

- Bed requested for Telemetry/MedSurg (Inpatient).                                                

- Status is Inpatient Admission.                                                              ea  

- Condition is Fair.                                                                              

- Problem is new.                                                                                 

- Symptoms have improved.                                                                         

UTI on Admission? No                                                                              

                                                                                                  

                                                                                                  

                                                                                                  

Signatures:                                                                                       

Dispatcher [employer]                           EDMS                                                 

Conrad Ellington                               rg2                                                  

Santino Mcnair MD MD cha Pena, Laura, RN                         RN   lp1                                                  

Belen Wetzel RN RN                                                      

Ines Bullock RN RN ea Singer, Phillip, MD MD   ps1                                                  

Jeff Candelario RN                    RN   mg2                                                  

                                                                                                  

Corrections: (The following items were deleted from the chart)                                    

02:35 02:31 Hospitalization Ordered by Alex Robledo MD for Inpatient Admission. Preliminary  rg2 

      diagnosis is Rectal Bleed, Stercoral Colitis, Fecal Impaction, Atrial fibrillation with     

      RVR. Bed requested for Telemetry/MedSurg (Inpatient). Status is Inpatient Admission.        

      Condition is Fair. Problem is new. Symptoms have improved. UTI on Admission? No. ps1        

02:51 02:35 2018 02:31 Hospitalization Ordered by Alex Robledo MD for Inpatient        cg  

      Admission. Preliminary diagnosis is Rectal Bleed, Stercoral Colitis, Fecal Impaction,       

      Atrial fibrillation with RVR. Bed requested for Telemetry/MedSurg (Inpatient). Status       

      is Inpatient Admission. Condition is Fair. Problem is new. Symptoms have improved. UTI      

      on Admission? No. rg2                                                                       

03:35 02:51 2018 02:31 Hospitalization Ordered by Alex Robledo MD for Inpatient        ea  

      Admission. Preliminary diagnosis is Rectal Bleed, Stercoral Colitis, Fecal Impaction,       

      Atrial fibrillation with RVR. Bed requested for Telemetry/MedSurg (Inpatient). Status       

      is Inpatient Admission. Condition is Fair. Problem is new. Symptoms have improved. UTI      

      on Admission? No. cg                                                                        

                                                                                                  

**************************************************************************************************

## 2018-06-20 LAB
BUN BLD-MCNC: 16 MG/DL (ref 7–18)
GLUCOSE SERPLBLD-MCNC: 85 MG/DL (ref 74–106)
HCT VFR BLD CALC: 30.7 % (ref 39.6–49)
LYMPHOCYTES # SPEC AUTO: 1.1 K/UL (ref 0.7–4.9)
MCH RBC QN AUTO: 34.2 PG (ref 27–35)
MCV RBC: 104.2 FL (ref 80–100)
PMV BLD: 7.1 FL (ref 7.6–11.3)
POTASSIUM SERPL-SCNC: 4.3 MMOL/L (ref 3.5–5.1)
RBC # BLD: 2.95 M/UL (ref 4.33–5.43)

## 2018-06-20 RX ADMIN — Medication SCH ML: at 21:48

## 2018-06-20 RX ADMIN — ATORVASTATIN CALCIUM SCH MG: 10 TABLET, FILM COATED ORAL at 21:48

## 2018-06-20 RX ADMIN — SODIUM CHLORIDE SCH MLS: 0.9 INJECTION, SOLUTION INTRAVENOUS at 09:44

## 2018-06-20 RX ADMIN — Medication SCH: at 09:00

## 2018-06-20 RX ADMIN — LEVOTHYROXINE SODIUM SCH MG: 112 TABLET ORAL at 05:41

## 2018-06-20 RX ADMIN — SODIUM CHLORIDE SCH MLS: 0.9 INJECTION, SOLUTION INTRAVENOUS at 18:51

## 2018-06-20 NOTE — PN
Date of Progress Note:  06/20/2018



X-rays were reviewed, which demonstrated no displacement of previously noted very small crack of the 
tibia and fibula.  There may be some new bone formation.  At this time, I will continue him in his kn
ee immobilizer, nonweightbearing.  Otherwise, I would have him follow up in my office whenever he is 
discharged.





MATT

DD:  06/20/2018 17:13:53Voice ID:  [id number]

DT:  06/20/2018 17:25:11Report ID:  [id number]

## 2018-06-21 RX ADMIN — Medication SCH ML: at 21:25

## 2018-06-21 RX ADMIN — Medication SCH: at 09:00

## 2018-06-21 RX ADMIN — LEVOTHYROXINE SODIUM SCH MG: 112 TABLET ORAL at 05:39

## 2018-06-21 RX ADMIN — SODIUM CHLORIDE SCH MLS: 0.9 INJECTION, SOLUTION INTRAVENOUS at 05:38

## 2018-06-21 RX ADMIN — ATORVASTATIN CALCIUM SCH MG: 10 TABLET, FILM COATED ORAL at 21:25

## 2018-06-21 RX ADMIN — SODIUM CHLORIDE SCH: 0.9 INJECTION, SOLUTION INTRAVENOUS at 23:00

## 2018-06-21 RX ADMIN — SODIUM CHLORIDE SCH: 0.9 INJECTION, SOLUTION INTRAVENOUS at 13:00

## 2018-06-21 NOTE — PN
His vitals and physical exam, there is no change.



Assessment And Plan:  We are still waiting on Gastroenterology, either Dr. Mcfarland or Dr. Haynes to se
e the patient.  To my understanding, they have been notified about 3 days ago when he was readmitted 
and have not seen any evaluation from them and any recommendation or plan.  I have talked with the nu
rse to call them back and emphasize the issue, because if they are not going to do any procedure on t
his patient, we can go back and discharge him to the nursing home.  Dr. Orozco has seen the patien
t.  His right knee x-ray showed nondisplaced fracture of the tibial metaphysis and fibular neck.





MFS/MODL

DD:  06/21/2018 15:42:33Voice ID:  [id number]

DT:  06/21/2018 16:02:24Report ID:  [id number]

## 2018-06-22 VITALS — DIASTOLIC BLOOD PRESSURE: 79 MMHG | SYSTOLIC BLOOD PRESSURE: 118 MMHG | TEMPERATURE: 98.9 F

## 2018-06-22 VITALS — OXYGEN SATURATION: 97 %

## 2018-06-22 RX ADMIN — SODIUM CHLORIDE SCH: 0.9 INJECTION, SOLUTION INTRAVENOUS at 09:00

## 2018-06-22 RX ADMIN — ATORVASTATIN CALCIUM SCH MG: 10 TABLET, FILM COATED ORAL at 21:57

## 2018-06-22 RX ADMIN — Medication SCH ML: at 13:46

## 2018-06-22 RX ADMIN — LEVOTHYROXINE SODIUM SCH MG: 112 TABLET ORAL at 06:08

## 2018-08-03 NOTE — DS
Date of Discharge:  06/12/2018



History Of Present Illness:  An 88-year-old male, who was admitted to the hospital when he presented 
with low back and right leg pain after he lost balance at home and fell.  He was found to have left t
ibial fracture and was admitted for that.



Past Medical History:  As per the admit note.



Social History:  As per the admit note.



Family History:  As per the admit note.



Medications:  As per the admit note.



Allergies:  AS PER THE ADMIT NOTE.



Physical Examination:

As per the admit note.



Diagnostic Data:  As per the admit note.



Hospital Course:  The patient was admitted to the hospital, put on pain medication and orthopedic was
 consulted.  CAT scan of his head was considered to be done because he was on Xarelto, actually he di
d not pain insult and injury.  The patient continued to do well and Dr. Orozco thought that the pa
tient does not need surgical intervention at this time.  He immobilized the leg and thought that he w
ill follow him as an outpatient in 10 days and do another x-ray.  The patient remained alert and orie
nted and hemodynamically stable.  We went ahead and stopped his Xarelto, put him on aspirin and Plavi
x because he is high risk for fall and went ahead and discharged the patient to 

continue the rest of his home medicines.  Look discharge orders for details.





MFS/MODL

DD:  08/03/2018 13:56:03Voice ID:  [id number]

DT:  08/03/2018 15:10:23Report ID:  [id number]

## 2018-08-17 ENCOUNTER — HOSPITAL ENCOUNTER (EMERGENCY)
Dept: HOSPITAL 97 - ER | Age: 83
Discharge: ANOTHER HOSPITAL | End: 2018-08-17
Payer: COMMERCIAL

## 2018-08-17 VITALS — TEMPERATURE: 98.6 F

## 2018-08-17 VITALS — DIASTOLIC BLOOD PRESSURE: 69 MMHG | SYSTOLIC BLOOD PRESSURE: 112 MMHG

## 2018-08-17 VITALS — OXYGEN SATURATION: 100 %

## 2018-08-17 DIAGNOSIS — I48.91: ICD-10-CM

## 2018-08-17 DIAGNOSIS — I10: ICD-10-CM

## 2018-08-17 DIAGNOSIS — E78.5: ICD-10-CM

## 2018-08-17 DIAGNOSIS — Z79.01: ICD-10-CM

## 2018-08-17 DIAGNOSIS — R57.1: Primary | ICD-10-CM

## 2018-08-17 DIAGNOSIS — I25.2: ICD-10-CM

## 2018-08-17 DIAGNOSIS — Z79.82: ICD-10-CM

## 2018-08-17 LAB
ALBUMIN SERPL BCP-MCNC: 3.1 G/DL (ref 3.4–5)
ALP SERPL-CCNC: 108 U/L (ref 45–117)
ALT SERPL W P-5'-P-CCNC: 14 U/L (ref 12–78)
AST SERPL W P-5'-P-CCNC: 17 U/L (ref 15–37)
BUN BLD-MCNC: 33 MG/DL (ref 7–18)
GLUCOSE SERPLBLD-MCNC: 111 MG/DL (ref 74–106)
HCT VFR BLD CALC: 29.5 % (ref 39.6–49)
INR BLD: 1.18
LIPASE SERPL-CCNC: 76 U/L (ref 73–393)
LYMPHOCYTES # SPEC AUTO: 2.2 K/UL (ref 0.7–4.9)
MCH RBC QN AUTO: 35.8 PG (ref 27–35)
MCV RBC: 104.9 FL (ref 80–100)
PMV BLD: 7.9 FL (ref 7.6–11.3)
POTASSIUM SERPL-SCNC: 4.2 MMOL/L (ref 3.5–5.1)
RBC # BLD: 2.81 M/UL (ref 4.33–5.43)
UA COMPLETE W REFLEX CULTURE PNL UR: (no result)

## 2018-08-17 PROCEDURE — 86901 BLOOD TYPING SEROLOGIC RH(D): CPT

## 2018-08-17 PROCEDURE — 99285 EMERGENCY DEPT VISIT HI MDM: CPT

## 2018-08-17 PROCEDURE — 93005 ELECTROCARDIOGRAM TRACING: CPT

## 2018-08-17 PROCEDURE — 96360 HYDRATION IV INFUSION INIT: CPT

## 2018-08-17 PROCEDURE — 81003 URINALYSIS AUTO W/O SCOPE: CPT

## 2018-08-17 PROCEDURE — 83690 ASSAY OF LIPASE: CPT

## 2018-08-17 PROCEDURE — 36430 TRANSFUSION BLD/BLD COMPNT: CPT

## 2018-08-17 PROCEDURE — 96361 HYDRATE IV INFUSION ADD-ON: CPT

## 2018-08-17 PROCEDURE — 30233N1 TRANSFUSION OF NONAUTOLOGOUS RED BLOOD CELLS INTO PERIPHERAL VEIN, PERCUTANEOUS APPROACH: ICD-10-PCS

## 2018-08-17 PROCEDURE — 81015 MICROSCOPIC EXAM OF URINE: CPT

## 2018-08-17 PROCEDURE — 36415 COLL VENOUS BLD VENIPUNCTURE: CPT

## 2018-08-17 PROCEDURE — 80048 BASIC METABOLIC PNL TOTAL CA: CPT

## 2018-08-17 PROCEDURE — 85025 COMPLETE CBC W/AUTO DIFF WBC: CPT

## 2018-08-17 PROCEDURE — 80076 HEPATIC FUNCTION PANEL: CPT

## 2018-08-17 PROCEDURE — 86900 BLOOD TYPING SEROLOGIC ABO: CPT

## 2018-08-17 PROCEDURE — 86850 RBC ANTIBODY SCREEN: CPT

## 2018-08-17 PROCEDURE — 85610 PROTHROMBIN TIME: CPT

## 2018-08-17 NOTE — EKG
Test Date:    2018-08-17               Test Time:    08:34:23

Technician:   EARL                                     

                                                     

MEASUREMENT RESULTS:                                       

Intervals:                                           

Rate:         74                                     

OK:                                                  

QRSD:         102                                    

QT:           402                                    

QTc:          446                                    

Axis:                                                

P:                                                   

OK:                                                  

QRS:          -35                                    

T:            21                                     

                                                     

INTERPRETIVE STATEMENTS:                                       

                                                     

Atrial fibrillation

Left axis deviation

Minimal voltage criteria for LVH, may be normal variant

Cannot rule out Anterior infarct, age undetermined

Abnormal ECG

Compared to ECG 06/18/2018 22:46:32

Left ventricular hypertrophy now present

Ventricular premature complex(es) no longer present

Myocardial infarct finding still present



Electronically Signed On 08-17-18 17:11:18 CDT by Francis Nick

## 2018-08-17 NOTE — EDPHYS
Physician Documentation                                                                           

 [facility]                                                                

Name: Jos Aguayo Jr                                                                              

Age: 89 yrs                                                                                       

Sex: Male                                                                                         

: 1929                                                                                   

MRN: [id number]                                                                                   

Arrival Date: 2018                                                                          

Time: 07:52                                                                                       

Account#: [id number]                                                                            

Bed 15                                                                                            

Private MD: Alex Robledo F                                                                     

ED Physician Ba Morrissey                                                                       

HPI:                                                                                              

                                                                                             

10:56 This 89 yrs old  Male presents to ER via Ambulatory with complaints of         gs  

      Constipation, Rectal Bleeding.                                                              

10:56 The patient presents to the emergency department with bleeding from the rectum/anus,    gs  

      that is severe. Onset: The symptoms/episode began/occurred this morning. Context: the       

      patient has no known special context relating to the rectal area complaint(s).              

      Associate signs and symptoms: Pertinent positives: lower GI bleeding, bright red,           

      Pertinent negatives: abdominal pain. The patient has not experienced similar symptoms       

      in the past. The patient has not recently seen a physician.                                 

                                                                                                  

Historical:                                                                                       

- Allergies:                                                                                      

08:12 No Known Allergies;                                                                     jb4 

- Home Meds:                                                                                      

08:12 aspirin 81 mg Oral TbEC 1 tab once daily [Active]; Plavix 75 mg Oral tab 1 tab once     jb4 

      daily [Active]; simvastatin 20 mg Oral tab 1 tab once daily [Active]; levothyroxine 112     

      mcg tab 1 tab once daily [Active]; Vitamin B-12 Oral 2500 IU daily [Active];                

- PMHx:                                                                                           

08:12 Atrial Fib; Hyperlipidemia; Hypothyroidism; Myocardial infarction; Hypertension;        jb4 

      Osteoporosis;                                                                               

- PSHx:                                                                                           

08:12 Hernia repair; Bypass;                                                                  jb4 

                                                                                                  

- Immunization history:: Adult Immunizations unknown.                                             

- Social history:: Smoking status: .                                                              

- Ebola Screening: : No symptoms or risks identified at this time.                                

                                                                                                  

                                                                                                  

ROS:                                                                                              

10:56 Abdomen/GI: Negative for abdominal pain.                                                gs  

10:56 All other systems are negative.                                                             

                                                                                                  

Exam:                                                                                             

10:56 Head/Face:  Normocephalic, atraumatic. Eyes:  Pupils equal round and reactive to light, gs  

      extra-ocular motions intact.  Lids and lashes normal.  Conjunctiva and sclera are           

      non-icteric and not injected.  Cornea within normal limits.  Periorbital areas with no      

      swelling, redness, or edema. ENT:  Nares patent. No nasal discharge, no septal              

      abnormalities noted.  Tympanic membranes are normal and external auditory canals are        

      clear.  Oropharynx with no redness, swelling, or masses, exudates, or evidence of           

      obstruction, uvula midline.  Mucous membranes moist. Neck:  Trachea midline, no             

      thyromegaly or masses palpated, and no cervical lymphadenopathy.  Supple, full range of     

      motion without nuchal rigidity, or vertebral point tenderness.  No Meningismus.             

      Chest/axilla:  Normal chest wall appearance and motion.  Nontender with no deformity.       

      No lesions are appreciated. Cardiovascular:  Regular rate and rhythm with a normal S1       

      and S2.  No gallops, murmurs, or rubs.  Normal PMI, no JVD.  No pulse deficits.             

      Respiratory:  Lungs have equal breath sounds bilaterally, clear to auscultation and         

      percussion.  No rales, rhonchi or wheezes noted.  No increased work of breathing, no        

      retractions or nasal flaring. Abdomen/GI:  Soft, non-tender, with normal bowel sounds.      

      No distension or tympany.  No guarding or rebound.  No evidence of tenderness               

      throughout. Back:  No spinal tenderness.  No costovertebral tenderness.  Full range of      

      motion. Skin:  Warm, dry with normal turgor.  Normal color with no rashes, no lesions,      

      and no evidence of cellulitis. MS/ Extremity:  Pulses equal, no cyanosis.                   

      Neurovascular intact.  Full, normal range of motion. Neuro:  Awake and alert, GCS 15,       

      oriented to person, place, time, and situation.  Cranial nerves II-XII grossly intact.      

      Motor strength 5/5 in all extremities.  Sensory grossly intact.  Cerebellar exam            

      normal.  Normal gait.                                                                       

10:56 Constitutional: The patient appears alert, awake.                                           

10:56 Abdomen/GI: Rectal exam: Stool: grossly bloody, hemorrhoid(s), are not appreciated.         

14:01 ECG was reviewed by the Attending Physician.                                              

                                                                                                  

Vital Signs:                                                                                      

08:04  / 97; Pulse 106; Resp 18; Temp 98.1; Pulse Ox 100% on R/A; Weight 81.65 kg;      jb4 

      Height 5 ft. 7 in. (170.18 cm);                                                             

08:15 BP 73 / 50; Pulse 93; Resp 18; Pulse Ox 97% on R/A; Pain 0/10;                          jb4 

08:38 BP 91 / 59; Pulse 82; Resp 21; Pulse Ox 100% on R/A; Pain 0/10;                         jb4 

08:45 BP 85 / 61; Pulse 94; Resp 17; Pulse Ox 98% on R/A; Pain 0/10;                          jb4 

09:00 BP 92 / 58; Pulse 77; Resp 15 S; Pulse Ox 100% on R/A; Pain 0/10;                       jb4 

09:15 BP 96 / 67; Pulse 91; Resp 15 S; Pulse Ox 100% on R/A; Pain 0/10;                       jb4 

09:45  / 63; Pulse 102; Resp 18; Pulse Ox 100% on R/A;                                  jb4 

10:00  / 54; Pulse 109; Resp 15; Pulse Ox 100% on R/A; Pain 0/10;                       jb4 

10:15  / 68; Pulse 92; Resp 15; Pulse Ox 100% on R/A;                                   jb4 

10:40  / 71; Pulse 78; Resp 17; Temp 97.7; Pulse Ox 100% ; Pain 0/10;                   jb4 

10:45  / 77; Pulse 80; Resp 12; Temp 97.6; Pulse Ox 100% ; Pain 0/10;                   jb4 

10:50  / 80; Pulse 89; Resp 14; Temp 97.7; Pulse Ox 100% on R/A; Pain 0/10;             jb4 

10:55  / 79; Pulse 89; Resp 19; Temp 97.7; Pulse Ox 100% on R/A; Pain 0/10;             jb4 

11:10  / 67; Pulse 98; Resp 15; Temp 97.7; Pulse Ox 100% on R/A; Pain 0/10;             jb4 

11:40  / 85; Pulse 93; Resp 18; Temp 98.6; Pulse Ox 100% on R/A; Pain 0/10;             jb4 

12:35  / 69; Pulse 102; Resp 23; Pulse Ox 100% on R/A; Pain 0/10;                       jb4 

08:04 Body Mass Index 28.19 (81.65 kg, 170.18 cm)                                             jb4 

                                                                                                  

MDM:                                                                                              

08:01 Patient medically screened.                                                             gs  

10:56 Differential diagnosis: hemorrhoids, angiodysplasia, diverticular bleeding, neoplasm.     

      Data reviewed: vital signs, nurses notes. Response to treatment: the patient's symptoms     

      have mildly improved after treatment, and as a result, I will administer blood or           

      plasma. ED course: episode of hypotension will resusciatate give ivf and blood.             

                                                                                                  

                                                                                             

08:05 Order name: Basic Metabolic Panel; Complete Time: 09:40                                 gs  

                                                                                             

08:05 Order name: CBC with Diff; Complete Time: 09:40                                         gs  

                                                                                             

08:05 Order name: Hepatic Function; Complete Time: 09:40                                      gs  

                                                                                             

08:05 Order name: Lipase; Complete Time: 09:40                                                gs  

                                                                                             

08:05 Order name: Urine Microscopic Only; Complete Time: 09:40                                gs  

                                                                                             

08:05 Order name: Type And Screen                                                             gs  

                                                                                             

08:07 Order name: PT-INR; Complete Time: 09:40                                                gs  

                                                                                             

08:32 Order name: EKG; Complete Time: 08:33                                                   ag  

                                                                                             

08:46 Order name: Urine Dipstick--Ancillary (enter results); Complete Time: 09:40             ag  

                                                                                             

09:45 Order name: Bb Add On                                                                   ag  

                                                                                             

10:00 Order name: Packed RBC Leukored AS-1                                                    EDMS

                                                                                             

08:05 Order name: IV Saline Lock; Complete Time: 08:24                                        gs  

                                                                                             

08:05 Order name: Labs collected and sent; Complete Time: 08:25                               gs  

                                                                                             

08:05 Order name: Urine Dipstick-Ancillary (obtain specimen); Complete Time: 08:43            gs  

                                                                                                  

EC:01 Rate is 74 beats/min. Rhythm is irregularly irregular. QRS interval is prolonged. T     gs  

      waves are Flattened. No ST changes noted. Clinical impression: Atrial Fibrillation.         

      Interpreted by me.                                                                          

                                                                                                  

Administered Medications:                                                                         

08:08 Drug: NS 0.9% 1000 ml Route: IV; Rate: 125 ml/hr; Site: right forearm;                  [facility] 

11:39 Follow up: IV Status: Infusion continued                                                [facility] 

12:58 Follow up: IV Status: Infusion continued; Infusion continued upon transfer                

08:27 Drug: NS 0.9% 1000 ml Route: IV; Rate: 1 bolus; Site: right forearm;                    hj  

09:10 Follow up: Response: No adverse reaction; IV Status: Completed infusion                 jb4 

11:39 Follow up: IV Status: Completed infusion; IV Intake: 1000ml                             [facility] 

08:29 Drug: NS 0.9% 1000 ml Route: IV; Rate: 1 bolus; Site: left forearm;                     hj  

09:45 Follow up: Response: No adverse reaction; IV Status: Completed infusion                 [facility] 

11:39 Follow up: IV Status: Completed infusion; IV Intake: 1000ml                             jb4 

                                                                                                  

                                                                                                  

Disposition:                                                                                      

18 11:06 Transfer ordered to [facility]. Diagnosis are                  

  Gastrointestinal hemorrhage, unspecified, Hypovolemic shock.                                    

- Reason for transfer: Higher level of care.                                                      

- Accepting physician is dr prabhakar.                                                                

- Condition is Stable.                                                                            

- Problem is new.                                                                                 

- Symptoms have improved.                                                                         

                                                                                                  

                                                                                                  

                                                                                                  

Critical care time excluding procedures:                                                          

10:56 Critical care time: Bedside Care: 10 minutes, Consultation: 10 minutes, Family          gs  

      Intervention: 10 minutes. Total time: 30 minutes                                            

                                                                                                  

Signatures:                                                                                       

Dispatcher [employer]                           EDMS                                                 

Iron Raines RN                      RN                                                      

Rik Gonzalez RN                       RN   jb4                                                  

Ba Morrissey MD MD gs                                                   

                                                                                                  

Corrections: (The following items were deleted from the chart)                                    

11:45 11:06 2018 11:06 Transfer ordered to [facility]. Diagnosis is gs  

      Gastrointestinal hemorrhage, unspecified; Hypovolemic shock. Reason for transfer:           

      Higher level of care. Accepting physician is tbd. Condition is Stable. Problem is new.      

      Symptoms have improved. gs                                                                  

13:03 11:45 2018 11:06 Transfer ordered to [facility]. Diagnosis is jb4 

      Gastrointestinal hemorrhage, unspecified; Hypovolemic shock. Reason for transfer:           

      Higher level of care. Accepting physician is dr prabhakar. Condition is Stable. Problem is      

      new. Symptoms have improved. gs                                                             

                                                                                                  

**************************************************************************************************

## 2018-08-17 NOTE — ER
Nurse's Notes                                                                                     

 [facility]                                                                

Name: Jos Aguayo Jr                                                                              

Age: 89 yrs                                                                                       

Sex: Male                                                                                         

: 1929                                                                                   

MRN: [id number]                                                                                   

Arrival Date: 2018                                                                          

Time: 07:52                                                                                       

Account#: [id number]                                                                            

Bed 15                                                                                            

Private MD: Alex Robledo F                                                                     

Diagnosis: Gastrointestinal hemorrhage, unspecified;Hypovolemic shock                             

                                                                                                  

Presentation:                                                                                     

                                                                                             

08:07 Presenting complaint: Patient states: i noticed mariel been bleeding from my rectum since  jb4 

      yesterday; fresh blood; Child states: " He went to the restroom last night, forgot to       

      flush and my son noticed the toilet was full of blood.". Transition of care: patient        

      was not received from another setting of care. Onset of symptoms is unknown. Risk           

      Assessment: Do you want to hurt yourself or someone else? Patient reports no desire to      

      harm self or others. Initial Sepsis Screen: Does the patient meet any 2 criteria? HR >      

      90 bpm. Yes Does the patient have a suspected source of infection? Yes: Acute abdominal     

      pain. Care prior to arrival: None.                                                          

08:07 Method Of Arrival: Ambulatory                                                           jb4 

08:07 Acuity: LM 3                                                                           jb4 

09:43 Acuity: LM 2                                                                           iw  

                                                                                                  

Historical:                                                                                       

- Allergies:                                                                                      

08:12 No Known Allergies;                                                                     jb4 

- Home Meds:                                                                                      

08:12 aspirin 81 mg Oral TbEC 1 tab once daily [Active]; Plavix 75 mg Oral tab 1 tab once     jb4 

      daily [Active]; simvastatin 20 mg Oral tab 1 tab once daily [Active]; levothyroxine 112     

      mcg tab 1 tab once daily [Active]; Vitamin B-12 Oral 2500 IU daily [Active];                

- PMHx:                                                                                           

08:12 Atrial Fib; Hyperlipidemia; Hypothyroidism; Myocardial infarction; Hypertension;        jb4 

      Osteoporosis;                                                                               

- PSHx:                                                                                           

08:12 Hernia repair; Bypass;                                                                  jb4 

                                                                                                  

- Immunization history:: Adult Immunizations unknown.                                             

- Social history:: Smoking status: .                                                              

- Ebola Screening: : No symptoms or risks identified at this time.                                

                                                                                                  

                                                                                                  

Screenin:00 Abuse screen: Denies threats or abuse. Nutritional screening: No deficits noted.        jb4 

      Tuberculosis screening: No symptoms or risk factors identified. Fall Risk Secondary         

      diagnosis (15 points) Kyphosis, A-fib. IV access (20 points). Gait- Normal/Bed              

      Rest/Wheelchair (0 pts) Total Delgado Fall Scale indicates Low Risk Score (25-44 pts).        

      Fall prevention measures have been instituted. Side Rails Up X 2 Placed close to            

      Nursing Station Frequent Obs/Assesments occuring Family Present and informed to notify      

      staff if they need to leave bedside.                                                        

                                                                                                  

Assessment:                                                                                       

08:00 General: Appears in no apparent distress. comfortable, Behavior is calm, cooperative,   jb4 

      appropriate for age. Pain: Denies pain. Neuro: Level of Consciousness is awake, alert,      

      obeys commands, Oriented to person, place, time. Cardiovascular: Heart tones S1 S2          

      present Capillary refill < 3 seconds Patient's skin is warm and dry. Rhythm is atrial       

      fibrillation. Respiratory: Airway is patent Respiratory effort is even, unlabored,          

      Respiratory pattern is regular, symmetrical, Breath sounds are clear bilaterally. GI:       

      Bowel sounds present X 4 quads. Abd is soft and non tender X 4 quads. : No signs          

      and/or symptoms were reported regarding the genitourinary system. EENT: No signs and/or     

      symptoms were reported regarding the EENT system. Derm: Skin is intact, Skin is pink,       

      warm \T\ dry. Musculoskeletal: Circulation, motion, and sensation intact.                   

08:15 Reassessment: MD notified on pts V/S, BP, will continue to monitor; started another IV  jb4 

      line; Patient denies pain at this time.                                                     

09:01 Reassessment: Patient and/or family updated on plan of care and expected duration. Pain jb4 

      level reassessed. Patient is alert, oriented x 3, equal unlabored respirations, skin        

      warm/dry/pink. awaiting results and POC: BP is going up and improving;.                     

09:18 Reassessment: Patient is alert, oriented x 3, equal unlabored respirations, skin        jb4 

      warm/dry/pink. First bolus is finished. b/p 96/67.                                          

09:45 Reassessment: Patient appears in no apparent distress at this time. Patient and/or      jb4 

      family updated on plan of care and expected duration. Pain level reassessed. Patient is     

      alert, oriented x 3, equal unlabored respirations, skin warm/dry/pink. Second bolus         

      finished. b/p 101/63.                                                                       

11:00 Reassessment: Patient appears in no apparent distress at this time. Patient and/or      jb4 

      family updated on plan of care and expected duration. Pain level reassessed. Patient is     

      alert, oriented x 3, equal unlabored respirations, skin warm/dry/pink. Blood                

      transfusion is running. No s/s of adverse reaction. noted. Patient denies pain at this      

      time.                                                                                       

11:37 Reassessment: Patient and/or family updated on plan of care and expected duration. Pain jb4 

      level reassessed. Patient is alert, oriented x 3, equal unlabored respirations, skin        

      warm/dry/pink. awaiting paper works transfer;.                                              

11:45 Reassessment: Patient appears in no apparent distress at this time. No changes from     jb4 

      previously documented assessment. Patient is alert, oriented x 3, equal unlabored           

      respirations, skin warm/dry/pink. Patient denies pain at this time. Respiratory: Breath     

      sounds are clear bilaterally.                                                               

12:04 Reassessment: Attempted to call report. Instructed to wait for call back.               4 

12:15 Reassessment: Report called to MOOK Ramirez.                                           [facility] 

12:30 Reassessment: Patient appears in no apparent distress at this time. Patient and/or      4 

      family updated on plan of care and expected duration. Pain level reassessed. Patient is     

      alert, oriented x 3, equal unlabored respirations, skin warm/dry/pink. Blood                

      transfusion complete. No S/S of transfusion reaction. Patient denies pain at this time.     

      Respiratory: Breath sounds are clear bilaterally.                                           

                                                                                                  

Vital Signs:                                                                                      

08:04  / 97; Pulse 106; Resp 18; Temp 98.1; Pulse Ox 100% on R/A; Weight 81.65 kg;      jb4 

      Height 5 ft. 7 in. (170.18 cm);                                                             

08:15 BP 73 / 50; Pulse 93; Resp 18; Pulse Ox 97% on R/A; Pain 0/10;                          jb4 

08:38 BP 91 / 59; Pulse 82; Resp 21; Pulse Ox 100% on R/A; Pain 0/10;                         jb4 

08:45 BP 85 / 61; Pulse 94; Resp 17; Pulse Ox 98% on R/A; Pain 0/10;                          jb4 

09:00 BP 92 / 58; Pulse 77; Resp 15 S; Pulse Ox 100% on R/A; Pain 0/10;                       jb4 

09:15 BP 96 / 67; Pulse 91; Resp 15 S; Pulse Ox 100% on R/A; Pain 0/10;                       jb4 

09:45  / 63; Pulse 102; Resp 18; Pulse Ox 100% on R/A;                                  jb4 

10:00  / 54; Pulse 109; Resp 15; Pulse Ox 100% on R/A; Pain 0/10;                       jb4 

10:15  / 68; Pulse 92; Resp 15; Pulse Ox 100% on R/A;                                   jb4 

10:40  / 71; Pulse 78; Resp 17; Temp 97.7; Pulse Ox 100% ; Pain 0/10;                   jb4 

10:45  / 77; Pulse 80; Resp 12; Temp 97.6; Pulse Ox 100% ; Pain 0/10;                   jb4 

10:50  / 80; Pulse 89; Resp 14; Temp 97.7; Pulse Ox 100% on R/A; Pain 0/10;             jb4 

10:55  / 79; Pulse 89; Resp 19; Temp 97.7; Pulse Ox 100% on R/A; Pain 0/10;             jb4 

11:10  / 67; Pulse 98; Resp 15; Temp 97.7; Pulse Ox 100% on R/A; Pain 0/10;             jb4 

11:40  / 85; Pulse 93; Resp 18; Temp 98.6; Pulse Ox 100% on R/A; Pain 0/10;             jb4 

12:35  / 69; Pulse 102; Resp 23; Pulse Ox 100% on R/A; Pain 0/10;                       jb4 

08:04 Body Mass Index 28.19 (81.65 kg, 170.18 cm)                                             jb4 

                                                                                                  

ED Course:                                                                                        

07:52 Patient arrived in ED.                                                                  rg4 

07:52 Alex Robledo MD is Private Physician.                                                rg4 

07:54 Ba Morrissey MD is Attending Physician.                                              gs  

07:55 Rik Gonzalez, RN is Primary Nurse.                                                     jb4 

08:00 Patient has correct armband on for positive identification. Placed in gown. Bed in low  jb4 

      position. Call light in reach. Side rails up X2. Cardiac monitor on. Pulse ox on. NIBP      

      on.                                                                                         

08:00 Arm band placed on right wrist.                                                         jb4 

08:08 Initial lab(s) drawn, by me, sent to lab. T\T\S collected, blood band applied to patient. hj

      Inserted saline lock: 22 gauge in right forearm, using aseptic technique. Blood             

      collected.                                                                                  

08:09 Triage completed.                                                                       jb4 

08:28 Inserted saline lock: 22 gauge in left forearm, using aseptic technique.                jb4 

08:42 EKG done, by EKG tech. reviewed by Ba Morrissey MD.                                    at1 

13:02 No provider procedures requiring assistance completed. Patient transferred, IV remains  jb4 

      in place.                                                                                   

                                                                                                  

Administered Medications:                                                                         

08:08 Drug: NS 0.9% 1000 ml Route: IV; Rate: 125 ml/hr; Site: right forearm;                  jb4 

11:39 Follow up: IV Status: Infusion continued                                                jb4 

12:58 Follow up: IV Status: Infusion continued; Infusion continued upon transfer              hj  

08:27 Drug: NS 0.9% 1000 ml Route: IV; Rate: 1 bolus; Site: right forearm;                    hj  

09:10 Follow up: Response: No adverse reaction; IV Status: Completed infusion                 jb4 

11:39 Follow up: IV Status: Completed infusion; IV Intake: 1000ml                             jb4 

08:29 Drug: NS 0.9% 1000 ml Route: IV; Rate: 1 bolus; Site: left forearm;                     hj  

09:45 Follow up: Response: No adverse reaction; IV Status: Completed infusion                 jb4 

11:39 Follow up: IV Status: Completed infusion; IV Intake: 1000ml                             jb4 

                                                                                                  

                                                                                                  

Intake:                                                                                           

11:39 IV: 1000ml; Total: 1000ml.                                                              jb4 

11:39 IV: 1000ml; Total: 2000ml.                                                              jb4 

                                                                                                  

Outcome:                                                                                          

11:06 ER care complete, transfer ordered by MD.                                               gs  

13:02 Transferred by [facility] to [facility], Transfer form completed.    jb4 

13:02 Condition: stable                                                                           

13:02 Instructed on the need for transfer, Demonstrated understanding of instructions.            

13:03 Patient left the ED.                                                                    jb4 

                                                                                                  

Signatures:                                                                                       

Laurita Hernandez RN                     Ashley Reed, EKG Tech              EKG Tat1                                                  

Iron Raines RN RN hj Garcia, Rubi                                 rg4                                                  

Rik Gonzalez RN RN jb4 Starr, Gregory, MD MD                                                      

                                                                                                  

Corrections: (The following items were deleted from the chart)                                    

08:45 08:07 Initial Sepsis Screen: Does the patient meet any 2 criteria? HR > 90 bpm. Yes     jb4 

      Does the patient have a suspected source of infection? Yes: Acute abdominal pain jb4        

09:46 08:07 Presenting complaint: Child states: " He went to the restroom last night, forgot  hj  

      to flush and my son noticed the toilet was full of blood." jb4                              

                                                                                                  

**************************************************************************************************

## 2018-09-08 ENCOUNTER — HOSPITAL ENCOUNTER (OUTPATIENT)
Dept: HOSPITAL 97 - ER | Age: 83
Setting detail: OBSERVATION
LOS: 1 days | Discharge: HOME | End: 2018-09-09
Attending: INTERNAL MEDICINE | Admitting: INTERNAL MEDICINE
Payer: COMMERCIAL

## 2018-09-08 VITALS — BODY MASS INDEX: 27.1 KG/M2

## 2018-09-08 DIAGNOSIS — K21.9: ICD-10-CM

## 2018-09-08 DIAGNOSIS — I25.10: ICD-10-CM

## 2018-09-08 DIAGNOSIS — Z79.02: ICD-10-CM

## 2018-09-08 DIAGNOSIS — Z95.1: ICD-10-CM

## 2018-09-08 DIAGNOSIS — E03.9: ICD-10-CM

## 2018-09-08 DIAGNOSIS — I50.40: ICD-10-CM

## 2018-09-08 DIAGNOSIS — I50.9: ICD-10-CM

## 2018-09-08 DIAGNOSIS — I25.2: ICD-10-CM

## 2018-09-08 DIAGNOSIS — I48.2: ICD-10-CM

## 2018-09-08 DIAGNOSIS — R07.89: Primary | ICD-10-CM

## 2018-09-08 DIAGNOSIS — Z79.82: ICD-10-CM

## 2018-09-08 DIAGNOSIS — E78.5: ICD-10-CM

## 2018-09-08 DIAGNOSIS — I11.0: ICD-10-CM

## 2018-09-08 DIAGNOSIS — J43.9: ICD-10-CM

## 2018-09-08 DIAGNOSIS — K44.9: ICD-10-CM

## 2018-09-08 DIAGNOSIS — M81.0: ICD-10-CM

## 2018-09-08 LAB
ALBUMIN SERPL BCP-MCNC: 3.3 G/DL (ref 3.4–5)
ALP SERPL-CCNC: 106 U/L (ref 45–117)
ALT SERPL W P-5'-P-CCNC: 18 U/L (ref 12–78)
ANISOCYTOSIS BLD QL: SLIGHT
AST SERPL W P-5'-P-CCNC: 22 U/L (ref 15–37)
BLD SMEAR INTERP: (no result)
BUN BLD-MCNC: 16 MG/DL (ref 7–18)
CKMB CREATINE KINASE MB: 1.7 NG/ML (ref 0.3–3.6)
GLUCOSE SERPLBLD-MCNC: 111 MG/DL (ref 74–106)
HCT VFR BLD CALC: 32 % (ref 39.6–49)
INR BLD: 1.08
LYMPHOCYTES # SPEC AUTO: 0.9 K/UL (ref 0.7–4.9)
MACROCYTES BLD QL: (no result)
MCH RBC QN AUTO: 35.8 PG (ref 27–35)
MCV RBC: 106.3 FL (ref 80–100)
MORPHOLOGY BLD-IMP: (no result)
NT-PROBNP SERPL-MCNC: 2655 PG/ML (ref ?–450)
PMV BLD: 7.7 FL (ref 7.6–11.3)
POTASSIUM SERPL-SCNC: 4.1 MMOL/L (ref 3.5–5.1)
RBC # BLD: 3.01 M/UL (ref 4.33–5.43)
TROPONIN (EMERG DEPT USE ONLY): 0.03 NG/ML (ref 0–0.04)

## 2018-09-08 PROCEDURE — 81001 URINALYSIS AUTO W/SCOPE: CPT

## 2018-09-08 PROCEDURE — 93005 ELECTROCARDIOGRAM TRACING: CPT

## 2018-09-08 PROCEDURE — 83880 ASSAY OF NATRIURETIC PEPTIDE: CPT

## 2018-09-08 PROCEDURE — 85025 COMPLETE CBC W/AUTO DIFF WBC: CPT

## 2018-09-08 PROCEDURE — 85730 THROMBOPLASTIN TIME PARTIAL: CPT

## 2018-09-08 PROCEDURE — 80048 BASIC METABOLIC PNL TOTAL CA: CPT

## 2018-09-08 PROCEDURE — 87040 BLOOD CULTURE FOR BACTERIA: CPT

## 2018-09-08 PROCEDURE — 82553 CREATINE MB FRACTION: CPT

## 2018-09-08 PROCEDURE — 82550 ASSAY OF CK (CPK): CPT

## 2018-09-08 PROCEDURE — 85610 PROTHROMBIN TIME: CPT

## 2018-09-08 PROCEDURE — 99285 EMERGENCY DEPT VISIT HI MDM: CPT

## 2018-09-08 PROCEDURE — 36415 COLL VENOUS BLD VENIPUNCTURE: CPT

## 2018-09-08 PROCEDURE — 84484 ASSAY OF TROPONIN QUANT: CPT

## 2018-09-08 PROCEDURE — 80076 HEPATIC FUNCTION PANEL: CPT

## 2018-09-08 PROCEDURE — 71045 X-RAY EXAM CHEST 1 VIEW: CPT

## 2018-09-08 RX ADMIN — FUROSEMIDE SCH MG: 10 INJECTION, SOLUTION INTRAVENOUS at 18:08

## 2018-09-08 RX ADMIN — Medication SCH ML: at 22:07

## 2018-09-08 NOTE — RAD REPORT
EXAM DESCRIPTION:  RAD - Chest Single View - 9/8/2018 12:53 pm

 

CLINICAL HISTORY:  CHEST PAIN

Chest pain.

 

COMPARISON:  Abdomen 1 View (KUB) dated 6/6/2018; Chest Single View dated 4/22/2018; Chest Pa And Lat
 (2 Views) dated 1/14/2018; Chest Pa And Lat (2 Views) dated 9/29/2017

 

FINDINGS:  Portable technique limits examination quality.

 

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 The heart is significantly enl
arged in size. A prominent hiatal hernia is present. No displaced fractures.Sternotomy wires present.

## 2018-09-08 NOTE — XMS REPORT
Patient Summary Document

 Created on:2018



Patient:BRAULIO GRANT

Sex:Male

:1929

External Reference #:[id number]





Demographics







 Address  [address] [place] [address]

 

 Home Phone  [phone]

 

 Email Address  NONE

 

 Preferred Language  Unknown

 

 Marital Status  Unknown

 

 Gnosticist Affiliation  Unknown

 

 Race  Unknown

 

 Additional Race(s)  Unavailable

 

 Ethnic Group  Unknown









Author







 Organization  [facility]

 

 Address  [address]



   [place] [address]

 

 Phone  [phone]









Care Team Providers







 Name  Role  Phone

 

 JERMAINE FELIICANO  Unavailable  Unavailable









Problems

This patient has no known problems.



Allergies, Adverse Reactions, Alerts

This patient has no known allergies or adverse reactions.



Medications

This patient has no known medications.



Results







 Test Description  Test Time  Test Comments  Text Results  Atomic Results  
Result Comments









 POCT-GLUCOSE METER  2018 12:49:00    









   Test Item  Value  Reference Range  Comments









 POC-GLUCOSE METER (BEAKER) (test  161 mg/dL    TESTED AT [address]



 djor=3764)      [place] [id number]



POCT-GLUCOSE [id number] 08:10:00





 Test Item  Value  Reference Range  Comments

 

 POC-GLUCOSE METER (BEAKER)  150 mg/dL    TESTED AT [address]



 (test trdj=1443)      [place] [id number]



TSH/FREE T4 IF [id number] 05:49:00





 Test Item  Value  Reference Range  Comments

 

 THYROID STIMULATING HORMONE (BEAKER) (test  1.20 uIU/mL  0.35-4.94  



 code=772)      



HEMOGLOBIN AND [id number] 05:27:00





 Test Item  Value  Reference Range  Comments

 

 HEMOGLOBIN (BEAKER) (test code=410)  8.6 GM/DL  13.7-17.5  

 

 HEMATOCRIT (BEAKER) (test code=411)  27.0 %  40.1-51.0  



POCT-GLUCOSE [id number] 22:03:00





 Test Item  Value  Reference Range  Comments

 

 POC-GLUCOSE METER (BEAKER)  144 mg/dL    TESTED AT [address]



 (test jhkd=4348)      [place] [id number]



HEMOGLOBIN AND [id number] 09:58:00





 Test Item  Value  Reference Range  Comments

 

 HEMOGLOBIN (BEAKER) (test code=410)  9.4 GM/DL  13.7-17.5  

 

 HEMATOCRIT (BEAKER) (test code=411)  29.2 %  40.1-51.0  



[id number] 05:40:00





 Test Item  Value  Reference Range  Comments

 

 MAGNESIUM (BEAKER) (test code=627)  1.9 mg/dL  1.6-2.6  



BASIC METABOLIC [id number] 05:40:00





 Test Item  Value  Reference Range  Comments

 

 SODIUM (BEAKER) (test  137 meq/L  136-145  



 jzty=234)      

 

 POTASSIUM (BEAKER) (test  4.3 meq/L  3.5-5.1  



 code=379)      

 

 CHLORIDE (BEAKER) (test  106 meq/L    



 code=382)      

 

 CO2 (BEAKER) (test  24 meq/L  22-29  



 code=355)      

 

 BLOOD UREA NITROGEN  18 mg/dL  7-21  



 (BEAKER) (test code=354)      

 

 CREATININE (BEAKER) (test  1.00 mg/dL  0.57-1.25  



 code=358)      

 

 GLUCOSE RANDOM (BEAKER)  111 mg/dL    



 (test code=652)      

 

 CALCIUM (BEAKER) (test  9.3 mg/dL  8.4-10.2  



 code=697)      

 

 EGFR (BEAKER) (test  85 mL/min/1.73 sq m    ESTIMATED GFR IS NOT AS



 code=1092)      ACCURATE AS CREATININE



       CLEARANCE IN PREDICTING



       GLOMERULAR FILTRATION



       RATE. ESTIMATED GFR IS



       NOT APPLICABLE FOR



       DIALYSIS PATIENTS.



CBC W/PLT COUNT &amp; AUTO [id number] 05:17:00





 Test Item  Value  Reference Range  Comments

 

 WHITE BLOOD CELL COUNT (BEAKER) (test code=775)  5.6 K/ L  3.5-10.5  

 

 RED BLOOD CELL COUNT (BEAKER) (test code=761)  2.47 M/ L  4.63-6.08  

 

 HEMOGLOBIN (BEAKER) (test code=410)  8.3 GM/DL  13.7-17.5  

 

 HEMATOCRIT (BEAKER) (test code=411)  26.2 %  40.1-51.0  

 

 MEAN CORPUSCULAR VOLUME (BEAKER) (test code=753)  106.1 fL  79.0-92.2  

 

 MEAN CORPUSCULAR HEMOGLOBIN (BEAKER) (test  33.6 pg  25.7-32.2  



 cjde=829)      

 

 MEAN CORPUSCULAR HEMOGLOBIN CONC (BEAKER) (test  31.7 GM/DL  32.3-36.5  



 code=752)      

 

 RED CELL DISTRIBUTION WIDTH (BEAKER) (test  14.5 %  11.6-14.4  



 code=412)      

 

 PLATELET COUNT (BEAKER) (test code=756)  125 K/CU MM  150-450  

 

 MEAN PLATELET VOLUME (BEAKER) (test code=754)  9.5 fL  9.4-12.4  

 

 NUCLEATED RED BLOOD CELLS (BEAKER) (test  0 /100 WBC  0-0  



 code=413)      

 

 NEUTROPHILS RELATIVE PERCENT (BEAKER) (test  84 %    



 code=429)      

 

 LYMPHOCYTES RELATIVE PERCENT (BEAKER) (test  14 %    



 code=430)      

 

 MONOCYTES RELATIVE PERCENT (BEAKER) (test  2 %    



 code=431)      

 

 EOSINOPHILS RELATIVE PERCENT (BEAKER) (test  0 %    



 code=432)      

 

 BASOPHILS RELATIVE PERCENT (BEAKER) (test  0 %    



 code=437)      

 

 NEUTROPHILS ABSOLUTE COUNT (BEAKER) (test  4.72 K/ L  1.78-5.38  



 code=670)      

 

 LYMPHOCYTES ABSOLUTE COUNT (BEAKER) (test  0.78 K/ L  1.32-3.57  



 code=414)      

 

 MONOCYTES ABSOLUTE COUNT (BEAKER) (test  0.11 K/ L  0.30-0.82  



 code=415)      

 

 EOSINOPHILS ABSOLUTE COUNT (BEAKER) (test  0.00 K/ L  0.04-0.54  



 code=416)      

 

 BASOPHILS ABSOLUTE COUNT (BEAKER) (test  0.00 K/ L  0.01-0.08  



 code=417)      

 

 IMMATURE GRANULOCYTES-RELATIVE PERCENT (BEAKER)  1 %  0-1  



 (test code=2801)      



HEMOGLOBIN AND [id number] 20:34:00





 Test Item  Value  Reference Range  Comments

 

 HEMOGLOBIN (BEAKER) (test code=410)  8.6 GM/DL  13.7-17.5  

 

 HEMATOCRIT (BEAKER) (test code=411)  26.9 %  40.1-51.0  



[id number] 14:28:00





 Test Item  Value  Reference Range  Comments

 

 CORTISOL, TOTAL (BEAKER) (test code=7305)  4.9 ug/dL  3.7-19.4  



HEMOGLOBIN AND [id number] 13:45:00





 Test Item  Value  Reference Range  Comments

 

 HEMOGLOBIN (BEAKER) (test code=410)  8.2 GM/DL  13.7-17.5  

 

 HEMATOCRIT (BEAKER) (test code=411)  25.2 %  40.1-51.0  



[id number] 05:05:00





 Test Item  Value  Reference Range  Comments

 

 MAGNESIUM (BEAKER) (test code=627)  1.7 mg/dL  1.6-2.6  



BASIC METABOLIC [id number] 05:05:00





 Test Item  Value  Reference Range  Comments

 

 SODIUM (BEAKER) (test  141 meq/L  136-145  



 avmg=787)      

 

 POTASSIUM (BEAKER) (test  3.5 meq/L  3.5-5.1  



 code=379)      

 

 CHLORIDE (BEAKER) (test  109 meq/L    



 code=382)      

 

 CO2 (BEAKER) (test  26 meq/L  22-29  



 code=355)      

 

 BLOOD UREA NITROGEN  24 mg/dL  7-21  



 (BEAKER) (test code=354)      

 

 CREATININE (BEAKER) (test  0.93 mg/dL  0.57-1.25  



 code=358)      

 

 GLUCOSE RANDOM (BEAKER)  91 mg/dL    



 (test code=652)      

 

 CALCIUM (BEAKER) (test  9.0 mg/dL  8.4-10.2  



 code=697)      

 

 EGFR (BEAKER) (test  93 mL/min/1.73 sq m    ESTIMATED GFR IS NOT AS



 code=1092)      ACCURATE AS CREATININE



       CLEARANCE IN PREDICTING



       GLOMERULAR FILTRATION



       RATE. ESTIMATED GFR IS



       NOT APPLICABLE FOR



       DIALYSIS PATIENTS.



CBC W/PLT COUNT &amp; AUTO [id number] 05:01:00





 Test Item  Value  Reference Range  Comments

 

 WHITE BLOOD CELL COUNT (BEAKER) (test code=775)  5.5 K/ L  3.5-10.5  

 

 RED BLOOD CELL COUNT (BEAKER) (test code=761)  2.26 M/ L  4.63-6.08  

 

 HEMOGLOBIN (BEAKER) (test code=410)  7.6 GM/DL  13.7-17.5  

 

 HEMATOCRIT (BEAKER) (test code=411)  23.9 %  40.1-51.0  

 

 MEAN CORPUSCULAR VOLUME (BEAKER) (test code=753)  105.8 fL  79.0-92.2  

 

 MEAN CORPUSCULAR HEMOGLOBIN (BEAKER) (test  33.6 pg  25.7-32.2  



 csmq=125)      

 

 MEAN CORPUSCULAR HEMOGLOBIN CONC (BEAKER) (test  31.8 GM/DL  32.3-36.5  



 code=752)      

 

 RED CELL DISTRIBUTION WIDTH (BEAKER) (test  15.0 %  11.6-14.4  



 code=412)      

 

 PLATELET COUNT (BEAKER) (test code=756)  123 K/CU MM  150-450  

 

 MEAN PLATELET VOLUME (BEAKER) (test code=754)  10.0 fL  9.4-12.4  

 

 NUCLEATED RED BLOOD CELLS (BEAKER) (test  0 /100 WBC  0-0  



 code=413)      

 

 NEUTROPHILS RELATIVE PERCENT (BEAKER) (test  59 %    



 code=429)      

 

 LYMPHOCYTES RELATIVE PERCENT (BEAKER) (test  28 %    



 code=430)      

 

 MONOCYTES RELATIVE PERCENT (BEAKER) (test  10 %    



 code=431)      

 

 EOSINOPHILS RELATIVE PERCENT (BEAKER) (test  3 %    



 code=432)      

 

 BASOPHILS RELATIVE PERCENT (BEAKER) (test  1 %    



 code=437)      

 

 NEUTROPHILS ABSOLUTE COUNT (BEAKER) (test  3.21 K/ L  1.78-5.38  



 code=670)      

 

 LYMPHOCYTES ABSOLUTE COUNT (BEAKER) (test  1.53 K/ L  1.32-3.57  



 code=414)      

 

 MONOCYTES ABSOLUTE COUNT (BEAKER) (test  0.56 K/ L  0.30-0.82  



 code=415)      

 

 EOSINOPHILS ABSOLUTE COUNT (BEAKER) (test  0.15 K/ L  0.04-0.54  



 code=416)      

 

 BASOPHILS ABSOLUTE COUNT (BEAKER) (test  0.03 K/ L  0.01-0.08  



 code=417)      

 

 IMMATURE GRANULOCYTES-RELATIVE PERCENT (BEAKER)  0 %  0-1  



 (test code=2801)      



HEMOGLOBIN AND [id number] 00:57:00





 Test Item  Value  Reference Range  Comments

 

 HEMOGLOBIN (BEAKER) (test code=410)  8.1 GM/DL  13.7-17.5  

 

 HEMATOCRIT (BEAKER) (test code=411)  25.5 %  40.1-51.0  



HEMOGLOBIN AND [id number] 19:06:00





 Test Item  Value  Reference Range  Comments

 

 HEMOGLOBIN (BEAKER) (test code=410)  7.5 GM/DL  13.7-17.5  

 

 HEMATOCRIT (BEAKER) (test code=411)  24.0 %  40.1-51.0  



HEMOGLOBIN AND [id number] 07:42:00





 Test Item  Value  Reference Range  Comments

 

 HEMOGLOBIN (BEAKER) (test code=410)  8.2 GM/DL  13.7-17.5  

 

 HEMATOCRIT (BEAKER) (test code=411)  26.0 %  40.1-51.0  



[id number] 05:34:00





 Test Item  Value  Reference Range  Comments

 

 MAGNESIUM (BEAKER) (test  2.1 mg/dL  1.6-2.6  Specimen slightly hemolyzed



 code=627)      



BASIC METABOLIC [id number] 05:34:00





 Test Item  Value  Reference Range  Comments

 

 SODIUM (BEAKER) (test  139 meq/L  136-145  



 atym=680)      

 

 POTASSIUM (BEAKER) (test  4.4 meq/L  3.5-5.1  Specimen slightly



 code=379)      hemolyzed

 

 CHLORIDE (BEAKER) (test  109 meq/L    



 code=382)      

 

 CO2 (BEAKER) (test  22 meq/L  22-29  



 code=355)      

 

 BLOOD UREA NITROGEN  32 mg/dL  7-21  



 (BEAKER) (test code=354)      

 

 CREATININE (BEAKER) (test  1.10 mg/dL  0.57-1.25  Specimen slightly



 code=358)      hemolyzed

 

 GLUCOSE RANDOM (BEAKER)  83 mg/dL    



 (test code=652)      

 

 CALCIUM (BEAKER) (test  9.3 mg/dL  8.4-10.2  



 code=697)      

 

 EGFR (BEAKER) (test  76 mL/min/1.73 sq m    ESTIMATED GFR IS NOT AS



 code=1092)      ACCURATE AS CREATININE



       CLEARANCE IN PREDICTING



       GLOMERULAR FILTRATION



       RATE. ESTIMATED GFR IS



       NOT APPLICABLE FOR



       DIALYSIS PATIENTS.



CBC W/PLT COUNT &amp; AUTO [id number] 05:03:00





 Test Item  Value  Reference Range  Comments

 

 WHITE BLOOD CELL COUNT (BEAKER) (test code=775)  6.7 K/ L  3.5-10.5  

 

 RED BLOOD CELL COUNT (BEAKER) (test code=761)  2.51 M/ L  4.63-6.08  

 

 HEMOGLOBIN (BEAKER) (test code=410)  8.4 GM/DL  13.7-17.5  

 

 HEMATOCRIT (BEAKER) (test code=411)  27.1 %  40.1-51.0  

 

 MEAN CORPUSCULAR VOLUME (BEAKER) (test code=753)  108.0 fL  79.0-92.2  

 

 MEAN CORPUSCULAR HEMOGLOBIN (BEAKER) (test  33.5 pg  25.7-32.2  



 zwfm=353)      

 

 MEAN CORPUSCULAR HEMOGLOBIN CONC (BEAKER) (test  31.0 GM/DL  32.3-36.5  



 code=752)      

 

 RED CELL DISTRIBUTION WIDTH (BEAKER) (test  15.2 %  11.6-14.4  



 code=412)      

 

 PLATELET COUNT (BEAKER) (test code=756)  133 K/CU MM  150-450  

 

 MEAN PLATELET VOLUME (BEAKER) (test code=754)  10.0 fL  9.4-12.4  

 

 NUCLEATED RED BLOOD CELLS (BEAKER) (test  0 /100 WBC  0-0  



 code=413)      

 

 NEUTROPHILS RELATIVE PERCENT (BEAKER) (test  66 %    



 code=429)      

 

 LYMPHOCYTES RELATIVE PERCENT (BEAKER) (test  23 %    



 code=430)      

 

 MONOCYTES RELATIVE PERCENT (BEAKER) (test  9 %    



 code=431)      

 

 EOSINOPHILS RELATIVE PERCENT (BEAKER) (test  2 %    



 code=432)      

 

 BASOPHILS RELATIVE PERCENT (BEAKER) (test  1 %    



 code=437)      

 

 NEUTROPHILS ABSOLUTE COUNT (BEAKER) (test  4.40 K/ L  1.78-5.38  



 code=670)      

 

 LYMPHOCYTES ABSOLUTE COUNT (BEAKER) (test  1.53 K/ L  1.32-3.57  



 code=414)      

 

 MONOCYTES ABSOLUTE COUNT (BEAKER) (test  0.57 K/ L  0.30-0.82  



 code=415)      

 

 EOSINOPHILS ABSOLUTE COUNT (BEAKER) (test  0.13 K/ L  0.04-0.54  



 code=416)      

 

 BASOPHILS ABSOLUTE COUNT (BEAKER) (test  0.05 K/ L  0.01-0.08  



 code=417)      

 

 IMMATURE GRANULOCYTES-RELATIVE PERCENT (BEAKER)  0 %  0-1  



 (test code=2801)      



HEMOGLOBIN AND [id number] 00:58:00





 Test Item  Value  Reference Range  Comments

 

 HEMOGLOBIN (BEAKER) (test code=410)  8.7 GM/DL  13.7-17.5  

 

 HEMATOCRIT (BEAKER) (test code=411)  27.8 %  40.1-51.0  



HEMOGLOBIN AND [id number] 20:47:00





 Test Item  Value  Reference Range  Comments

 

 HEMOGLOBIN (BEAKER) (test code=410)  8.5 GM/DL  13.7-17.5  

 

 HEMATOCRIT (BEAKER) (test code=411)  27.2 %  40.1-51.0  



CBC W/PLT COUNT &amp; AUTO [id number] 20:47:00





 Test Item  Value  Reference Range  Comments

 

 WHITE BLOOD CELL COUNT (BEAKER) (test code=775)  4.8 K/ L  3.5-10.5  

 

 RED BLOOD CELL COUNT (BEAKER) (test code=761)  2.51 M/ L  4.63-6.08  

 

 HEMOGLOBIN (BEAKER) (test code=410)  8.5 GM/DL  13.7-17.5  

 

 HEMATOCRIT (BEAKER) (test code=411)  27.2 %  40.1-51.0  

 

 MEAN CORPUSCULAR VOLUME (BEAKER) (test code=753)  108.4 fL  79.0-92.2  

 

 MEAN CORPUSCULAR HEMOGLOBIN (BEAKER) (test  33.9 pg  25.7-32.2  



 bdmp=481)      

 

 MEAN CORPUSCULAR HEMOGLOBIN CONC (BEAKER) (test  31.3 GM/DL  32.3-36.5  



 code=752)      

 

 RED CELL DISTRIBUTION WIDTH (BEAKER) (test  15.3 %  11.6-14.4  



 code=412)      

 

 PLATELET COUNT (BEAKER) (test code=756)  127 K/CU MM  150-450  

 

 MEAN PLATELET VOLUME (BEAKER) (test code=754)  9.5 fL  9.4-12.4  

 

 NUCLEATED RED BLOOD CELLS (BEAKER) (test  0 /100 WBC  0-0  



 code=413)      

 

 NEUTROPHILS RELATIVE PERCENT (BEAKER) (test  57 %    



 code=429)      

 

 LYMPHOCYTES RELATIVE PERCENT (BEAKER) (test  29 %    



 code=430)      

 

 MONOCYTES RELATIVE PERCENT (BEAKER) (test  11 %    



 code=431)      

 

 EOSINOPHILS RELATIVE PERCENT (BEAKER) (test  3 %    



 code=432)      

 

 BASOPHILS RELATIVE PERCENT (BEAKER) (test  1 %    



 code=437)      

 

 NEUTROPHILS ABSOLUTE COUNT (BEAKER) (test  2.72 K/ L  1.78-5.38  



 code=670)      

 

 LYMPHOCYTES ABSOLUTE COUNT (BEAKER) (test  1.37 K/ L  1.32-3.57  



 code=414)      

 

 MONOCYTES ABSOLUTE COUNT (BEAKER) (test  0.53 K/ L  0.30-0.82  



 code=415)      

 

 EOSINOPHILS ABSOLUTE COUNT (BEAKER) (test  0.13 K/ L  0.04-0.54  



 code=416)      

 

 BASOPHILS ABSOLUTE COUNT (BEAKER) (test  0.04 K/ L  0.01-0.08  



 code=417)      

 

 IMMATURE GRANULOCYTES-RELATIVE PERCENT (BEAKER)  0 %  0-1  



 (test code=2801)      



BASIC METABOLIC [id number] 19:38:00





 Test Item  Value  Reference Range  Comments

 

 SODIUM (BEAKER) (test  139 meq/L  136-145  



 wvpp=957)      

 

 POTASSIUM (BEAKER) (test  4.4 meq/L  3.5-5.1  



 code=379)      

 

 CHLORIDE (BEAKER) (test  110 meq/L    



 code=382)      

 

 CO2 (BEAKER) (test  24 meq/L  22-29  



 code=355)      

 

 BLOOD UREA NITROGEN  29 mg/dL  7-21  



 (BEAKER) (test code=354)      

 

 CREATININE (BEAKER) (test  1.13 mg/dL  0.57-1.25  



 code=358)      

 

 GLUCOSE RANDOM (BEAKER)  82 mg/dL    



 (test code=652)      

 

 CALCIUM (BEAKER) (test  9.5 mg/dL  8.4-10.2  



 code=697)      

 

 EGFR (BEAKER) (test  74 mL/min/1.73 sq m    ESTIMATED GFR IS NOT AS



 code=1092)      ACCURATE AS CREATININE



       CLEARANCE IN PREDICTING



       GLOMERULAR FILTRATION



       RATE. ESTIMATED GFR IS



       NOT APPLICABLE FOR



       DIALYSIS PATIENTS.



LACTIC ACID, VENOUS, WHOLE [id number] 19:34:00





 Test Item  Value  Reference Range  Comments

 

 LACTATE BLOOD VENOUS (2) (BEAKER) (test  1.3 mmol/L  0.5-2.2  



 code=2872)      



Effective 2016: Units/Reference Range ChangeNew: 0.5-2.2 mmol/L  Previous: 5
-20 mg/dLPROTHROMBIN [id number] 19:27:00





 Test Item  Value  Reference Range  Comments

 

 PROTIME (BEAKER) (test code=759)  16.6 seconds  11.7-14.7  

 

 INR (BEAKER) (test code=370)  1.3  <=5.9  



RECOMMENDED COUMADIN/WARFARIN INR THERAPY RANGESSTANDARD DOSE: 2.0 - 3.0   
Includes: PROPHYLAXIS forvenous thrombosis, systemic embolization; TREATMENT 
for venous thrombosis and/or pulmonary embolus.HIGH RISK: Target INR is 2.5-3.5 
for patients with mechanical heart valves.HEMOGLOBIN AND [id number] 18
:03:00





 Test Item  Value  Reference Range  Comments

 

 HEMOGLOBIN (BEAKER) (test code=410)  9.2 GM/DL  13.7-17.5  

 

 HEMATOCRIT (BEAKER) (test code=411)  29.3 %  40.1-51.0

## 2018-09-08 NOTE — ER
Nurse's Notes                                                                                     

 [facility]                                                                

Name: Jos Aguayo Jr                                                                              

Age: 89 yrs                                                                                       

Sex: Male                                                                                         

: 1929                                                                                   

MRN: [id number]                                                                                   

Arrival Date: 2018                                                                          

Time: 12:17                                                                                       

Account#: [id number]                                                                            

Bed 15                                                                                            

Private MD: Alex Robledo F                                                                     

Diagnosis: Chest pain, unspecified;Unspecified combined systolic (congestive) and diastolic       

  (congestive) heart failure                                                                      

                                                                                                  

Presentation:                                                                                     

                                                                                             

12:25 Presenting complaint: Patient states: He started having pain on the left side of his    rb1 

      chest after he had eaten. Transition of care: patient was not received from another         

      setting of care. Onset of symptoms was 2018. Risk Assessment: Do you want     

      to hurt yourself or someone else? Patient reports no desire to harm self or others.         

      Initial Sepsis Screen: Does the patient meet any 2 criteria? No. Patient's initial          

      sepsis screen is negative. Does the patient have a suspected source of infection? No.       

      Patient's initial sepsis screen is negative. Care prior to arrival: None.                   

12:25 Method Of Arrival: Wheelchair                                                           rb1 

12:25 Acuity: LM 3                                                                           rb1 

                                                                                                  

Triage Assessment:                                                                                

12:25 General: Appears in no apparent distress. comfortable, slender, Behavior is calm,       rb1 

      cooperative. Pain: Complains of pain in anterior aspect of left upper chest Pain            

      currently is 7 out of 10 on a pain scale. EENT: Ear canal has hearing aids in bilateral     

      ears.. Neuro: Level of Consciousness is awake, alert, obeys commands, Oriented to           

      person, place, time, situation. Cardiovascular: Capillary refill < 3 seconds is brisk       

      in bilateral fingers. Respiratory: Airway is patent Respiratory effort is even,             

      unlabored, Respiratory pattern is regular, symmetrical. GI: Reports constipation. :       

      No signs and/or symptoms were reported regarding the genitourinary system. Derm: Skin       

      is pink, warm \T\ dry.                                                                      

                                                                                                  

Historical:                                                                                       

- Allergies:                                                                                      

12:25 No Known Allergies;                                                                     rb1 

- Home Meds:                                                                                      

12:25 aspirin 81 mg Oral TbEC 1 tab once daily [Active]; levothyroxine 112 mcg tab 1 tab once rb1 

      daily [Active]; Plavix 75 mg Oral tab 1 tab once daily [Active]; simvastatin 20 mg Oral     

      tab 1 tab once daily [Active]; Vitamin B-12 Oral 2500 IU daily [Active];                    

- PMHx:                                                                                           

12:25 Atrial Fib; Hypertension; Hyperlipidemia; Hypothyroidism; Myocardial infarction;        rb1 

      Osteoporosis;                                                                               

- PSHx:                                                                                           

12:25 Hernia repair; Bypass;                                                                  rb1 

                                                                                                  

- Immunization history:: Adult Immunizations up to date.                                          

- Family history:: not pertinent.                                                                 

- Ebola Screening: : Patient negative for fever greater than or equal to 101.5 degrees            

  Fahrenheit, and additional compatible Ebola Virus Disease symptoms.                             

- Social history:: Smoking status: Patient/guardian denies using tobacco.                         

- Hospitalizations: : No recent hospitalization is reported.                                      

                                                                                                  

                                                                                                  

Screenin:25 Abuse screen: Denies threats or abuse. Nutritional screening: No deficits noted.        rb1 

      Tuberculosis screening: No symptoms or risk factors identified.                             

12:25 Fall Risk No fall in past 12 months (0 pts). No secondary diagnosis (0 pts). IV access  rb1 

      (20 points). Ambulatory Aid- None/Bed Rest/Nurse Assist (0 pts). Gait- Normal/Bed           

      Rest/Wheelchair (0 pts) Mental Status- Oriented to own ability (0 pts). Total Delgado         

      Fall Scale indicates No Risk (0-24 pts).                                                    

                                                                                                  

Assessment:                                                                                       

12:25 General: See triage asssessment.                                                        rb1 

12:25 Pain: Pain does not radiate. Pain began 1 hour ago.                                     rb1 

13:22 Reassessment: Patient appears in no apparent distress at this time. No changes from     rb1 

      previously documented assessment. Wife at bedside.                                          

14:20 Reassessment: Patient appears in no apparent distress at this time. Pt. stated, "I feel rb1 

      ok right now." Patient denies pain at this time.                                            

15:20 Reassessment: Patient appears in no apparent distress at this time. Patient and/or      rb1 

      family updated on plan of care and expected duration. Pain level reassessed. Patient is     

      alert, oriented x 3, equal unlabored respirations, skin warm/dry/pink.                      

16:15 Reassessment: Patient appears in no apparent distress at this time. No changes from     rb1 

      previously documented assessment. Family at bedside.                                        

                                                                                                  

Vital Signs:                                                                                      

12:25  / 95; Pulse 83; Resp 18; Temp 98.5(TE); Pulse Ox 96% on R/A; Weight 80.74 kg     rb1 

      (R); Height 5 ft. 7 in. (170.18 cm) (R); Pain 7/10;                                         

13:00  / 65; Pulse 71; Resp 20; Pulse Ox 100% on R/A;                                   rb1 

14:00  / 76; Pulse 68; Resp 14; Pulse Ox 98% on R/A;                                    rb1 

15:00  / 80; Pulse 68; Resp 14; Pulse Ox 100% ;                                         rb1 

16:00  / 87; Pulse 70; Resp 16; Pulse Ox 99% on R/A;                                    rb1 

12:25 Body Mass Index 27.88 (80.74 kg, 170.18 cm)                                             rb1 

                                                                                                  

ED Course:                                                                                        

12:17 Patient arrived in ED.                                                                  sb2 

12:18 Alex Robledo MD is Private Physician.                                                sb2 

12:21 Carlo June MD is Attending Physician.                                                rn  

12:25 Patient maintains SpO2 saturation greater than 95% on room air.                         rb1 

12:25 Arm band placed on right wrist.                                                         rb1 

12:25 Patient has correct armband on for positive identification. Bed in low position. Call   rb1 

      light in reach. Side rails up X2. Cardiac monitor on. Pulse ox on. NIBP on.                 

12:25 Warm blanket given.                                                                     rb1 

12:44 Inserted saline lock: 20 gauge in right forearm, using aseptic technique. Blood         la1 

      collected.                                                                                  

12:54 XRAY Chest (1 view) In Process Unspecified.                                             EDMS

13:15 First set of blood cultures drawn by me, by venipuncture 23G to left ac.                dh3 

13:32 Second set of blood cultures drawn by me, by venipuncture 23G to left ac.               dh3 

13:37 Luz Hodge, RN is Primary Nurse.                                                   rb1 

13:51 Triage completed.                                                                       rb1 

14:33 Alex Robledo MD is Hospitalizing Provider.                                           rn  

16:15 No provider procedures requiring assistance completed. Patient admitted, IV remains in  rb1 

      place.                                                                                      

                                                                                                  

Administered Medications:                                                                         

No medications were administered                                                                  

                                                                                                  

                                                                                                  

Outcome:                                                                                          

14:33 Decision to Hospitalize by Provider.                                                    rn  

16:15 Patient left the ED.                                                                    rb1 

16:15 Admitted to Tele accompanied by tech, via wheelchair, room 407, with chart, Report      rb1 

      called to  MOOK Ellis. Information from the [facility] was given. All questions asked and         

      answered.                                                                                   

16:15 Condition: stable                                                                           

16:15 Instructed on the need for admit.                                                           

                                                                                                  

Signatures:                                                                                       

Dispatcher [employer]                           EDMS                                                 

Carlo June MD MD rn Attema, Lee, RN                         RN   la1                                                  

Luz Hodge, MOOK DELVALLE   rb1                                                  

Aziza Reynolds                              dh3                                                  

Brittaney Sosa                               sb2                                                  

                                                                                                  

Corrections: (The following items were deleted from the chart)                                    

17:04 16:47 Patient left the ED. rb1                                                          rb1 

                                                                                                  

**************************************************************************************************

## 2018-09-08 NOTE — EDPHYS
Physician Documentation                                                                           

 [facility]                                                                

Name: Jos Aguayo Jr                                                                              

Age: 89 yrs                                                                                       

Sex: Male                                                                                         

: 1929                                                                                   

MRN: [id number]                                                                                   

Arrival Date: 2018                                                                          

Time: 12:17                                                                                       

Account#: [id number]                                                                            

Bed 15                                                                                            

Private MD: Alex Robledo F                                                                     

ED Physician Carlo June                                                                         

HPI:                                                                                              

                                                                                             

13:14 This 89 yrs old  Male presents to ER via Unassigned with complaints of Chest   rn  

      Pain, Chest Tightness.                                                                      

13:14 The patient or guardian reports chest pain that is located primarily in the anterior    rn  

      chest wall, left. Onset: this morning. The pain does not radiate. Associated signs and      

      symptoms: Pertinent positives: shortness of breath, vomiting, Pertinent negatives:          

      abdominal pain, cough, diaphoresis, dizziness, headache, lower extremity pain, lower        

      extremity swelling, lightheadedness, near syncope, palpitations. The chest pain is          

      described as a heaviness. Duration: The patient or guardian reports a single episode,       

      that is still ongoing. Severity of pain: At its worst the pain was mild in the              

      emergency department the pain is unchanged. The patient has not experienced similar         

      symptoms in the past. REports chest pain and sob that began earlier today, no trauma, +     

      cough and vomiting, no fever, has never had chest pain like this, + CAD, + mild sob.        

      Began when started eating this AM..                                                         

                                                                                                  

Historical:                                                                                       

- Allergies:                                                                                      

12:25 No Known Allergies;                                                                     rb1 

- Home Meds:                                                                                      

12:25 aspirin 81 mg Oral TbEC 1 tab once daily [Active]; levothyroxine 112 mcg tab 1 tab once rb1 

      daily [Active]; Plavix 75 mg Oral tab 1 tab once daily [Active]; simvastatin 20 mg Oral     

      tab 1 tab once daily [Active]; Vitamin B-12 Oral 2500 IU daily [Active];                    

- PMHx:                                                                                           

12:25 Atrial Fib; Hypertension; Hyperlipidemia; Hypothyroidism; Myocardial infarction;        rb1 

      Osteoporosis;                                                                               

- PSHx:                                                                                           

12:25 Hernia repair; Bypass;                                                                  rb1 

                                                                                                  

- Immunization history:: Adult Immunizations up to date.                                          

- Family history:: not pertinent.                                                                 

- Ebola Screening: : Patient negative for fever greater than or equal to 101.5 degrees            

  Fahrenheit, and additional compatible Ebola Virus Disease symptoms.                             

- Social history:: Smoking status: Patient/guardian denies using tobacco.                         

- Hospitalizations: : No recent hospitalization is reported.                                      

                                                                                                  

                                                                                                  

ROS:                                                                                              

13:14 Constitutional: Negative for fever, chills, and weight loss, Eyes: Negative for injury, rn  

      pain, redness, and discharge, Neck: Negative for injury, pain, and swelling,                

      Cardiovascular: Negative for palpitations, and edema, Respiratory: Negative for             

      wheezing, and pleuritic chest pain, Abdomen/GI: Negative for abdominal pain, diarrhea,      

      and constipation MS/Extremity: Negative for injury and deformity, Skin: Negative for        

      injury, rash, and discoloration, Neuro: Negative for headache, weakness, numbness,          

      tingling, and seizure.                                                                      

                                                                                                  

Exam:                                                                                             

13:14 Constitutional:  This is a well developed, well nourished patient who is awake, alert,  rn  

      and in no acute distress. Head/Face:  Normocephalic, atraumatic. Eyes:  Pupils equal        

      round and reactive to light, extra-ocular motions intact.  Lids and lashes normal.          

      Conjunctiva and sclera are non-icteric and not injected.  Cornea within normal limits.      

      Periorbital areas with no swelling, redness, or edema. Neck:  Trachea midline, no           

      thyromegaly or masses palpated, and no cervical lymphadenopathy.  Supple, full range of     

      motion without nuchal rigidity, or vertebral point tenderness.  No Meningismus.             

      Cardiovascular:  Irregular, normal rate Respiratory:  + mild tachypnea, no retractions,     

      diminished at bases Abdomen/GI:  soft, non-tender MS/ Extremity:  Pulses equal, no          

      cyanosis.  Neurovascular intact.  Full, normal range of motion.  Equal circumference.       

      Neuro:  Awake and alert, GCS 15, oriented to person, place, time, and situation.            

      Cranial nerves II-XII grossly intact.  Motor strength 5/5 in all extremities.  Sensory      

      grossly intact.                                                                             

                                                                                                  

Vital Signs:                                                                                      

12:25  / 95; Pulse 83; Resp 18; Temp 98.5(TE); Pulse Ox 96% on R/A; Weight 80.74 kg     rb1 

      (R); Height 5 ft. 7 in. (170.18 cm) (R); Pain 7/10;                                         

13:00  / 65; Pulse 71; Resp 20; Pulse Ox 100% on R/A;                                   rb1 

14:00  / 76; Pulse 68; Resp 14; Pulse Ox 98% on R/A;                                    rb1 

15:00  / 80; Pulse 68; Resp 14; Pulse Ox 100% ;                                         rb1 

16:00  / 87; Pulse 70; Resp 16; Pulse Ox 99% on R/A;                                    rb1 

12:25 Body Mass Index 27.88 (80.74 kg, 170.18 cm)                                             rb1 

                                                                                                  

MDM:                                                                                              

12:21 Patient medically screened.                                                             rn  

14:28 Differential diagnosis: abnormal EKG, acute myocardial infarction, acute pericarditis,  rn  

      coronary artery disease chest wall pain, costochondritis, gastroesophageal reflux           

      disease (GERD), pleurisy, pneumonia, pneumothorax. Data reviewed: vital signs, nurses       

      notes, lab test result(s), radiologic studies, plain films, and as a result, I will         

      discharge patient. Counseling: I had a detailed discussion with the patient and/or          

      guardian regarding: the historical points, exam findings, and any diagnostic results        

      supporting the discharge/admit diagnosis, lab results, radiology results, the need for      

      further work-up and treatment in the hospital. Response to treatment: the patient's         

      symptoms have mildly improved after treatment, and as a result, I will admit patient.       

      Admission orders: after a detailed discussion of the patient's condition and case, the      

      admit orders are written by me.                                                             

14:31 ED course: Admitted to Dr. Robledo, no answer, left message, currently chest pain free..rn  

                                                                                                  

                                                                                             

12:28 Order name: PT-INR; Complete Time: 13:                                                rn  

                                                                                             

12:28 Order name: Ptt, Activated; Complete Time: 13:                                        rn  

                                                                                             

12:28 Order name: Basic Metabolic Panel; Complete Time: 13:                                 rn  

                                                                                             

12:28 Order name: CBC with Diff; Complete Time: 13:18                                         rn  

                                                                                             

12:28 Order name: Ckmb; Complete Time: 13:                                                  rn  

                                                                                             

12:28 Order name: CPK; Complete Time: 13:                                                   rn  

                                                                                             

12:28 Order name: LFT's; Complete Time: 13:                                                 rn  

                                                                                             

12:28 Order name: NT PRO-BNP; Complete Time: 13:                                            rn  

                                                                                             

12:28 Order name: Troponin (emerg Dept Use Only); Complete Time: 13:                        rn  

                                                                                             

12:28 Order name: Blood Culture Adult (2)                                                     rn  

                                                                                             

12:54 Order name: CBC Smear Scan; Complete Time: 13:18                                        EDMS

                                                                                             

14:36 Order name: Basic Metabolic Panel                                                       EDMS

                                                                                             

14:36 Order name: Basic Metabolic Panel                                                       EDMS

                                                                                             

14:36 Order name: CBC with Automated Diff                                                     EDMS

                                                                                             

12:28 Order name: XRAY Chest (1 view); Complete Time: 13:08                                   rn  

                                                                                             

12:28 Order name: EKG; Complete Time: 12:29                                                   rn  

                                                                                             

12:28 Order name: Cardiac monitoring; Complete Time: 17:00                                    rn  

                                                                                             

12:28 Order name: EKG - Nurse/Tech; Complete Time: 17:                                      rn  

                                                                                             

12:28 Order name: IV Saline Lock; Complete Time: 17:                                        rn  

                                                                                             

12:28 Order name: Labs collected and sent; Complete Time: 17:                               rn  

                                                                                             

12:28 Order name: O2 Per Protocol; Complete Time: 17:                                       rn  

                                                                                             

12:28 Order name: O2 Sat Monitoring; Complete Time: 17:                                     rn  

                                                                                             

14:36 Order name: Low Sodium                                                                  EDMS

                                                                                             

14:36 Order name: CBC with Automated Diff                                                     EDMS

                                                                                             

14:36 Order name: NT PRO-BNP                                                                  EDMS

                                                                                             

14:36 Order name: NT PRO-BNP                                                                  EDMS

                                                                                             

14:36 Order name: Troponin I                                                                  EDMS

                                                                                             

14:36 Order name: Troponin I                                                                  EDMS

                                                                                             

14:36 Order name: Troponin I                                                                  EDMS

                                                                                             

15:49 Order name: Diet Ada 1800 Jose G; Complete Time: 15:50                                     rb1 

                                                                                                  

Administered Medications:                                                                         

No medications were administered                                                                  

                                                                                                  

                                                                                                  

Disposition:                                                                                      

18 14:33 Hospitalization ordered by Alex Robledo for Observation. Preliminary             

  diagnosis are Chest pain, unspecified, Unspecified combined systolic                            

  (congestive) and diastolic (congestive) heart failure.                                          

- Bed requested for Telemetry/MedSurg (observation).                                              

- Status is Observation.                                                                      rb1 

- Condition is Stable.                                                                            

- Problem is new.                                                                                 

- Symptoms have improved.                                                                         

UTI on Admission? No                                                                              

                                                                                                  

                                                                                                  

                                                                                                  

Signatures:                                                                                       

Dispatcher [employer]                           EDMS                                                 

Carlo June MD MD rn Barber, Rebecca, RN                     RN   Selam Marroquin                                                   

                                                                                                  

Corrections: (The following items were deleted from the chart)                                    

14:52 14:33 Hospitalization Ordered by Alex Robledo MD for Observation. Preliminary          eb  

      diagnosis is Chest pain, unspecified; Unspecified combined systolic (congestive) and        

      diastolic (congestive) heart failure. Bed requested for Telemetry/MedSurg                   

      (observation). Status is Observation. Condition is Stable. Problem is new. Symptoms         

      have improved. UTI on Admission? No. rn                                                     

16:47 14:52 2018 14:33 Hospitalization Ordered by Alex Robledo MD for Observation.     rb1 

      Preliminary diagnosis is Chest pain, unspecified; Unspecified combined systolic             

      (congestive) and diastolic (congestive) heart failure. Bed requested for                    

      Telemetry/MedSurg (observation). Status is Observation. Condition is Stable. Problem is     

      new. Symptoms have improved. UTI on Admission? No. eb                                       

                                                                                                  

**************************************************************************************************

## 2018-09-08 NOTE — XMS REPORT
Clinical Summary

 Created on:2018



Patient:Jos Aguayo

Sex:Male

:1929

External Reference #:[id number]





Demographics







 Address  [address] [place] [id number]

 

 Home Phone  [phone]

 

 Preferred Language  English

 

 Marital Status  Unknown

 

 Protestant Affiliation  Unknown

 

 Race  Black or 

 

 Ethnic Group  Not  or 









Author







 Organization  [facility]

 

 Address  [address] [place] [address]

 

 Phone  [phone]









Support







 Name  Relationship  Address  Phone

 

 Jos Aguayo  Unavailable  [address] [place] [address]  [phone]



     [place] [address]  









Care Team Providers







 Name  Role  Phone

 

 Unavailable  Primary Care Provider  Unavailable









Allergies

No Known Allergies



Current Medications







 Prescription  Sig.  Disp.  Refills  Start Date  End Date  Status

 

 clopidogrel (PLAVIX) 75  Take 75 mg by          Active



 mg tablet  mouth daily.          

 

 aspirin 81 MG EC tablet  Take 81 mg by          Active



   mouth daily.          

 

 levothyroxine  Take 125 mcg by          Active



 (SYNTHROID, LEVOTHROID)  mouth Every          



 125 MCG tablet  morning on an          



   empty stomach.          

 

 simvastatin (ZOCOR) 20  Take 20 mg by          Active



 MG tablet  mouth nightly.          

 

 cyanocobalamin, vitamin  Take by mouth          Active



 B-12, 2,500 mcg Tab  daily.          

 

 hydrocortisone (CORTEF)  Take 2 tablets  90 tablet  1  2018    Active



 10 MG tablet  in the morning          



   and 1 tablet in          



   the evening.          







Active Problems







 Problem  Noted Date

 

 Anemia associated with acute blood loss  2018

 

 Thrombocytopenia (HCC)  2018

 

 Acquired hypothyroidism  2018

 

 Adrenal insufficiency (HCC)  2018

 

 Lower GI bleed  2018







Encounters







 Date  Type  Specialty  Care Team  Description

 

 2018  Anesthesia Event  Gastroenterology  Selma Zaman CRNA  

 

 2018  Procedure Pass  Gastroenterology    

 

 2018  Surgery  Gastroenterology  Jonathon Bowden  COLONOSCOPY



       Kal Alaniz MD  

 

 2018  Anesthesia Event  Gastroenterology  Sera Cuevas CRNA  

 

 2018  Procedure Pass  Gastroenterology    

 

 2018  Surgery  Gastroenterology  Jonathon Bowden  UPPER ENDOSCOPY



       Kal Alaniz MD  

 

 2018  [facility]  Cardiology  Erick Presley  Lower GI



 -  Encounter    Demar Gao MD



  bleed;Acute blood



 2018      jax Angulo anemia;Other



       Vel Phillip MD



  hypotension;Adrenal



       Ronn Manzanares MD  ([facility]);Thrombocytope



         sachi ([facility]);Acquired



         hypothyroidism;Adre



         nal insufficiency



         ([facility])

 

 2018  Orders Only  General Internal    



     Medicine    



after 2017



Social History







 Tobacco Use  Types  Packs/Day  Years Used  Date

 

 Never Assessed        









 Sex Assigned at Birth  Date Recorded

 

 Not on file  







Last Filed Vital Signs







 Vital Sign  Reading  Time Taken

 

 Blood Pressure  103/56  2018 12:19 PM CDT

 

 Pulse  62  2018 12:19 PM CDT

 

 Temperature  36 C (96.8 F)  2018 12:19 PM CDT

 

 Respiratory Rate  20  2018 12:19 PM CDT

 

 Oxygen Saturation  98%  2018 12:19 PM CDT

 

 Inhaled Oxygen Concentration  -  -

 

 Weight  81.2 kg (179 lb 0.2 oz)  2018  5:34 AM CDT

 

 Height  170.2 cm (5' 7")  2018  2:37 PM CDT

 

 Body Mass Index  28.04  2018  5:34 AM CDT







Plan of Treatment

Not on file



Procedures







 Procedure Name  Priority  Date/Time  Associated Diagnosis  Comments

 

 COLONOSCOPY    2018 11:00 AM CDT  Hematochezia  

 

 UPPER ENDOSCOPY    2018 10:00 AM CDT  Hematochezia  



after 2017



Results

RHYTHM STRIP - SCAN (2018  8:20 AM)POC-Glucose meter (2018 12:43 PM)
Only the most recent of3 resultswithin the time period is included.





 Component  Value  Ref Range

 

 POC-Glucose Meter  161 (H)Comment: TESTED AT [address]  70 - 
110 mg/dL



   [place] [address]  









 Specimen  Performing Laboratory

 

 Blood  [address] [address]



TSH/Free T4 If Indicated (2018  4:50 AM)





 Component  Value  Ref Range

 

 TSH  1.20  0.35 - 4.94 uIU/mL









 Specimen  Performing Laboratory

 

 Blood - Arm, Right  [address] [address]



Hemoglobin and hematocrit (2018  4:50 AM)Only the most recent of10 
resultswithin the time period is included.





 Component  Value  Ref Range

 

 Hemoglobin  8.6 (L)  13.7 - 17.5 GM/DL

 

 Hematocrit  27.0 (L)  40.1 - 51.0 %









 Specimen  Performing Laboratory

 

 Blood - Arm, [address] [address]



ACTH (2018  4:39 PM)





 Component  Value  Ref Range

 

 ACTH  <5 (L)  6 - 50 pg/mL



   Comment:  



     



   Reference range applies only to the specimens collected between 7am-10am.  









 Specimen  Performing Laboratory

 

 Blood  [facility]







   [facility]







   [address] [place] [address]









 Narrative

 

 Performing Lab







  E[facility]







  [facility]







  [address] [place] [address]







  DIANE Burger MD, PhD, JULIANA



CBC with platelet count + automated diff (2018  5:00 AM)Only the most 
recent of4 resultswithin the time period is included.





 Component  Value  Ref Range

 

 WBC  5.6  3.5 - 10.5 K/L

 

 RBC  2.47 (L)  4.63 - 6.08 M/L

 

 Hemoglobin  8.3 (L)  13.7 - 17.5 GM/DL

 

 Hematocrit  26.2 (L)  40.1 - 51.0 %

 

 MCV  106.1 (H)  79.0 - 92.2 fL

 

 MCH  33.6 (H)  25.7 - 32.2 pg

 

 MCHC  31.7 (L)  32.3 - 36.5 GM/DL

 

 RDW  14.5 (H)  11.6 - 14.4 %

 

 Platelets  125 (L)  150 - 450 K/CU MM

 

 MPV  9.5  9.4 - 12.4 fL

 

 nRBC  0  0 - 0 /100 WBC

 

 % Neutros  84  %

 

 % Lymphs  14  %

 

 % Monos  2  %

 

 % Eos  0  %

 

 % Baso  0  %

 

 # Neutros  4.72  1.78 - 5.38 K/L

 

 # Lymphs  0.78 (L)  1.32 - 3.57 K/L

 

 # Monos  0.11 (L)  0.30 - 0.82 K/L

 

 # Eos  0.00 (L)  0.04 - 0.54 K/L

 

 # Baso  0.00 (L)  0.01 - 0.08 K/L

 

 Immature Granulocytes-Relative  1  0 - 1 %









 Specimen  Performing Laboratory

 

 Blood - Arm, [address] [place] [address]



CBC with platelet count + automated diff (2018  5:00 AM)Only the most 
recent of4 resultswithin the time period is included.





 Specimen  Performing Laboratory

 

 Blood  









 Narrative

 

 The following orders were created for panel order CBC with platelet count + 
automated



 diff.







 Procedure



 Abnormality Status 







 ---------



 ----------- ------ 







 CBC with platelet count ...[[id number]]AbnormalFinal



 result 







  







 Please view results for these tests on the individual orders.



Magnesium (2018  5:00 AM)Only the most recent of3 resultswithin the time 
period is included.





 Component  Value  Ref Range

 

 Magnesium  1.9  1.6 - 2.6 mg/dL









 Specimen  Performing Laboratory

 

 Blood - Arm, Right  [address] [address]



Basic Metabolic Panel (2018  5:00 AM)Only the most recent of4 
resultswithin the time period is included.





 Component  Value  Ref Range

 

 Sodium  137  136 - 145 meq/L

 

 Potassium  4.3  3.5 - 5.1 meq/L

 

 Chloride  106  98 - 107 meq/L

 

 CO2  24  22 - 29 meq/L

 

 BUN  18  7 - 21 mg/dL

 

 Creatinine  1.00  0.57 - 1.25 mg/dL

 

 Glucose  111 (H)  70 - 105 mg/dL

 

 Calcium  9.3  8.4 - 10.2 mg/dL

 

 EGFR  85Comment: ESTIMATED GFR IS NOT AS ACCURATE AS  mL/min/1.73 sq m



   CREATININE CLEARANCE IN PREDICTING GLOMERULAR FILTRATION  



   RATE. ESTIMATED GFR IS NOT APPLICABLE FOR DIALYSIS  



   PATIENTS.  









 Specimen  Performing Laboratory

 

 Blood - Arm, Right  [address] [address]



REPORT OF PROCEDURE - ENDOSCOPY URL (2018  3:23 PM)Only the most recent 
of2 resultswithin the time period is included.Cortisol (2018  1:31 PM)





 Component  Value  Ref Range

 

 Cortisol, Total  4.9  3.7 - 19.4 ug/dL









 Specimen  Performing Laboratory

 

 Blood - Line, Venous  [address] [address]



ECG 12 lead (2018 11:51 PM)





 Specimen  Performing Laboratory

 

   GE MUSE









 Narrative

 

 Ventricular Rate 92 BPM







 Atrial Rate 125 BPM







 QRS Duration 94 ms







 Q-T Interval 364 ms







 QTC Calculation(Bazett) 450 ms







 R Axis -37 degrees







 T Axis 79 degrees







  







 Atrial fibrillation with premature ventricular or aberrantly conducted 
complexes







 Left axis deviation







 Poor R wave progression Cannot rule out Anterior infarct , age undetermined







 Abnormal ECG







 No previous ECGs available







 Confirmed by ELIZA MATHIS BASANT () on 2018 8:07:30 AM









 Procedure Note

 

 Interface, External Ris In - 2018  8:07 AM CDT



Ventricular Rate 92 BPM



 Atrial Rate 125 BPM



 QRS Duration 94 ms



 Q-T Interval 364 ms



 QTC Calculation(Bazett) 450 ms



 R Axis -37 degrees



 T Axis 79 degrees



 



 Atrial fibrillation with premature ventricular or aberrantly conducted 
complexes



 Left axis deviation



 Poor R wave progression Cannot rule out Anterior infarct , age undetermined



 Abnormal ECG



 No previous ECGs available



 Confirmed by ELIZA MATHIS BASANT () on 2018 8:07:30 AM



Lactic acid, venous, whole blood (2018  6:56 PM)





 Component  Value  Ref Range

 

 Lactate, Venous  1.3  0.5 - 2.2 mmol/L









 Specimen  Performing Laboratory

 

 Blood  [address] [address]









 Narrative

 

  







 Effective 2016: Units/Reference Range Change







 New: 0.5-2.2 mmol/LPrevious: 5-20 mg/dL



Prothrombin time/INR (2018  6:56 PM)





 Component  Value  Ref Range

 

 Protime  16.6 (H)  11.7 - 14.7 seconds

 

 INR  1.3  <=5.9









 Specimen  Performing Laboratory

 

 Blood  [address] [address]









 Narrative

 

  







 RECOMMENDED COUMADIN/WARFARIN INR THERAPY RANGES







 STANDARD DOSE: 2.0 - 3.0 Includes: PROPHYLAXIS for venous thrombosis, 
systemic



 embolization; TREATMENT for venous thrombosis and/or pulmonary embolus.







 HIGH RISK: Target INR is 2.5-3.5 for patients with mechanical heart valves.



after 2017

## 2018-09-09 VITALS — OXYGEN SATURATION: 99 %

## 2018-09-09 VITALS — TEMPERATURE: 97.6 F | DIASTOLIC BLOOD PRESSURE: 62 MMHG | SYSTOLIC BLOOD PRESSURE: 120 MMHG

## 2018-09-09 LAB
BLD SMEAR INTERP: (no result)
BUN BLD-MCNC: 16 MG/DL (ref 7–18)
GLUCOSE SERPLBLD-MCNC: 83 MG/DL (ref 74–106)
HCT VFR BLD CALC: 30 % (ref 39.6–49)
LYMPHOCYTES # SPEC AUTO: 1.6 K/UL (ref 0.7–4.9)
MACROCYTES BLD QL: (no result)
MCH RBC QN AUTO: 35.4 PG (ref 27–35)
MCV RBC: 105.9 FL (ref 80–100)
MORPHOLOGY BLD-IMP: (no result)
PMV BLD: 7.6 FL (ref 7.6–11.3)
POIKILOCYTOSIS BLD QL SMEAR: (no result)
POTASSIUM SERPL-SCNC: 4 MMOL/L (ref 3.5–5.1)
RBC # BLD: 2.84 M/UL (ref 4.33–5.43)
UA COMPLETE W REFLEX CULTURE PNL UR: (no result)

## 2018-09-09 RX ADMIN — Medication SCH ML: at 08:55

## 2018-09-09 RX ADMIN — FUROSEMIDE SCH MG: 10 INJECTION, SOLUTION INTRAVENOUS at 08:54

## 2018-09-09 NOTE — EKG
Test Date:    2018-09-08               Test Time:    12:34:02

Technician:   REENA                                     

                                                     

MEASUREMENT RESULTS:                                       

Intervals:                                           

Rate:         72                                     

CO:                                                  

QRSD:         100                                    

QT:           388                                    

QTc:          424                                    

Axis:                                                

P:                                                   

CO:                                                  

QRS:          -36                                    

T:            90                                     

                                                     

INTERPRETIVE STATEMENTS:                                       

                                                     

Atrial fibrillation

Left axis deviation

Minimal voltage criteria for LVH, may be normal variant

Cannot rule out Anterior infarct, age undetermined

Abnormal ECG

Compared to ECG 08/17/2018 08:34:23

No significant changes



Electronically Signed On 09-09-18 08:52:42 CDT by Francis Nick

## 2018-09-10 NOTE — SS
Date of Discharge:  09/09/2018



History Of Present Illness:  An 89-year-old male with history of coronary artery disease and chronic 
atrial fibrillation.  He also follows up with Cardiology.  While he was having some food yesterday, merissa bolanos felt nauseated along with some chest discomfort and he threw up one time.  He came to [facility]
 because of his high-risk coronary artery disease, was admitted for observation.  After he threw up 
one time, he had no other symptoms or chest pain.  He had no increased shortness of breath, no palpit
ation, and voiced no other complaints.



Review of Systems:

Gastrointestinal:  As above. 

Cardiovascular:  No palpitation.  No other complaints. 

Respiratory:  No complaint. 

Genitourinary:  No complaint. 

Skeletomuscular:  No complaint. 

Neurological:  No complaint.



Past Medical History:  

1.As above coronary artery disease.

2.Chronic atrial fibrillation.

3.Hypertension.

4.Hyperlipidemia.

5.The patient has had gastrointestinal bleeding on Xarelto, so that was stopped in the past.

6.Hypothyroidism.

7.History of gastroesophageal reflux disease with hiatal hernia.



Family History:  Noncontributory.



Social History:  No smoking, alcohol, or drug abuse history.



Medications:  Include aspirin 81 mg daily, Plavix 75 mg p.o. daily, vitamin B12, levothyroxine 112 mc
g, and simvastatin 20 mg.



Allergies:  NO KNOWN DRUG ALLERGIES.



Physical Examination:

Vital Signs:  Blood pressure 115/75, pulse 70, temperature 97.8, room air pulse oximetry more than 90
%. 

Heart:  Regular rate and rhythm. 

Chest:  Clear to auscultation. 

Abdomen:  Soft, benign.  Bowel sounds are audible. 

Neurological examination:  Alert, oriented, nonfocal.  Grossly intact. 

Extremities:  No edema.  No cyanosis.  Peripheral pulses are felt.



Diagnostic Data:  Chest x-ray, no acute pathology.  EKG showed atrial fibrillation with left axis dev
iation.



Laboratory Data:  Hemoglobin 10, hematocrit 30, , platelets 172.  Troponin 0.03 on all 3 sets.
  BNP 2655.  Urinalysis is negative.



Hospital Course:  The patient was admitted to the hospital.  Cardiac enzymes were drawn, came back ne
gative for myocardial infarction.  The patient was nonsymptomatic throughout his overnight hospital s
andrea.  Cardiology has seen the patient, and I thought that the patient from the cardiac standpoint is 
hemodynamically stable that he could be following up with them with a stress echo this coming week.  
Since the patient is not symptomatic and hemodynamically stable, I thought the patient was stable madelyn
ugh to be discharged to follow up as the plan per Cardiology and follow up with me.  Look discharge o
rders for details.





MFS/MODL

DD:  09/09/2018 12:11:44Voice ID:  [id number]

DT:  09/10/2018 06:39:54Report ID:  [id number]

## 2018-09-10 NOTE — CON
Date of Consultation:  09/09/2018



Admitted to Dr. Robledo's service on 09/08/2018.  The patient was seen by me on 09/09/2018



Reason For Consultation:  Chest pain and history of congestive heart failure.



History Of Present Illness:  Mr. Aguayo is an 89-year-old white male, very active, very intelligent, r
emains alert and oriented.  He has a history of CABG in the past, I believe in 2009.  He has a histor
y of hypertension, dyslipidemia, hypothyroidism, and chronic atrial fibrillation.  He takes aspirin a
nd Plavix from an anticoagulation standpoint.  He is also on Synthroid and Zocor for his hypothyroidi
sm and dyslipidemia.  His hypertension is well controlled without any medication.  He came in with ch
est pain, sharp, stabbing, vomiting that started after he ate some sarah.  Denied any PND, orthopnea,
 pedal edema, palpitations, or syncope.  EKG showed chronic atrial fibrillation.  Chest x-ray showed 
emphysema.



Laboratory Data:  Creatinine is 1.2.  Troponin is negative.  Hemoglobin was 10.8.  His BNP was 2747.



Allergies:  NEGATIVE.



Review of Systems:

His 10-point review of systems is negative.



Social History:  Negative.



Family History:  Negative.



Medications At Home:  Were stated earlier.



Physical Examination:

General:  He is very pleasant, alert and oriented x3, asymptomatic. 

HEENT:  Negative. 

Neck:  Supple.  No bruit. 

Chest:  Clear to auscultation and percussion. 

Cardiac:  Revealed a regular rhythm and rate without any murmurs, gallops, or rubs. 

Abdomen:  Benign. 

Extremities:  Revealed no clubbing, cyanosis, or edema.



Diagnostic Data:  As stated earlier.



Impression And Plan:  

1.Atypical chest pain, most likely gastroesophageal reflux disease related.

2.Chronic obstructive pulmonary disease.

3.Chronic atrial fibrillation, rate controlled on aspirin and Plavix.

4.Hypothyroidism.

5.Dyslipidemia.

6.Hypertension, well controlled.

7.History of coronary disease status post coronary artery bypass graft. 

Mr. Aguayo has ruled out.  His symptoms are atypical.  I am comfortable with him going home today and 
I will make arrangements for him to have an echocardiogram and a Lexiscan in the office.  He normally
 sees Dr. Hemphill.





NB/MODL

DD:  09/10/2018 05:55:32Voice ID:  [id number]

DT:  09/10/2018 11:16:13Report ID:  [id number]

## 2018-10-13 ENCOUNTER — HOSPITAL ENCOUNTER (EMERGENCY)
Dept: HOSPITAL 97 - ER | Age: 83
Discharge: HOME | End: 2018-10-13
Payer: COMMERCIAL

## 2018-10-13 VITALS — DIASTOLIC BLOOD PRESSURE: 80 MMHG | OXYGEN SATURATION: 100 % | SYSTOLIC BLOOD PRESSURE: 113 MMHG

## 2018-10-13 VITALS — TEMPERATURE: 98.1 F

## 2018-10-13 DIAGNOSIS — E78.5: ICD-10-CM
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
## 2018-10-13 DIAGNOSIS — S41.111A: Primary | ICD-10-CM

## 2018-10-13 DIAGNOSIS — I10: ICD-10-CM

## 2018-10-13 DIAGNOSIS — I25.2: ICD-10-CM
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
## 2018-10-13 DIAGNOSIS — E03.9: ICD-10-CM
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
## 2018-10-13 PROCEDURE — 73080 X-RAY EXAM OF ELBOW: CPT

## 2018-10-13 PROCEDURE — 12004 RPR S/N/AX/GEN/TRK7.6-12.5CM: CPT

## 2018-10-13 PROCEDURE — 73090 X-RAY EXAM OF FOREARM: CPT

## 2018-10-13 PROCEDURE — 99284 EMERGENCY DEPT VISIT MOD MDM: CPT

## 2018-10-13 PROCEDURE — 0JQG0ZZ REPAIR RIGHT LOWER ARM SUBCUTANEOUS TISSUE AND FASCIA, OPEN APPROACH: ICD-10-PCS

## 2018-10-13 NOTE — RAD REPORT
EXAM DESCRIPTION:  RAD - Forearm Left - 10/13/2018 1:41 pm

 

CLINICAL HISTORY:  Fall, arm pain

 

COMPARISON:  None.

 

FINDINGS:  No fracture is identified. There is no dislocation or periosteal reaction noted. Degenerat
mariel changes are present at the elbow joint. Bones are osteopenic. Patient has advanced degenerative c
hange at the first carpal - metacarpal articulation.

 

 

Soft tissue wounds are evident. Arterial calcifications are present. No air or foreign body in the so
ft tissues.

 

IMPRESSION:  Soft tissue injury without foreign body.

 

No fracture or acute bone finding.

## 2018-10-13 NOTE — XMS REPORT
Clinical Summary

 Created on:2018



Patient:Jos Aguayo

Sex:Male

:1929

External Reference #:[id number]





Demographics







 Address  [address] [place] [address]

 

 Home Phone  [phone]

 

 Preferred Language  English

 

 Marital Status  Unknown

 

 Mandaen Affiliation  Unknown

 

 Race  Black or 

 

 Ethnic Group  Not  or 









Author







 Organization  [facility]

 

 Address  [address] [place] [address]

 

 Phone  [phone]









Support







 Name  Relationship  Address  Phone

 

 Jos Aguayo  Unavailable  [address] [place] [address]  [phone]



     [place] [address]  









Care Team Providers







 Name  Role  Phone

 

 Unavailable  Primary Care Provider  Unavailable









Allergies

No Known Allergies



Current Medications







 Prescription  Sig.  Disp.  Refills  Start Date  End Date  Status

 

 clopidogrel (PLAVIX) 75  Take 75 mg by          Active



 mg tablet  mouth daily.          

 

 aspirin 81 MG EC tablet  Take 81 mg by          Active



   mouth daily.          

 

 levothyroxine  Take 125 mcg by          Active



 (SYNTHROID, LEVOTHROID)  mouth Every          



 125 MCG tablet  morning on an          



   empty stomach.          

 

 simvastatin (ZOCOR) 20  Take 20 mg by          Active



 MG tablet  mouth nightly.          

 

 cyanocobalamin, vitamin  Take by mouth          Active



 B-12, 2,500 mcg Tab  daily.          

 

 hydrocortisone (CORTEF)  Take 2 tablets  90 tablet  1  2018    Active



 10 MG tablet  in the morning          



   and 1 tablet in          



   the evening.          







Active Problems







 Problem  Noted Date

 

 Anemia associated with acute blood loss  2018

 

 Thrombocytopenia (HCC)  2018

 

 Acquired hypothyroidism  2018

 

 Adrenal insufficiency (HCC)  2018

 

 Lower GI bleed  2018







Encounters







 Date  Type  Specialty  Care Team  Description

 

 2018  Anesthesia Event  Gastroenterology  Selma Zaman CRNA  

 

 2018  Procedure Pass  Gastroenterology    

 

 2018  Surgery  Gastroenterology  Jonathon Bowden  COLONOSCOPY



       Kal Alaniz MD  

 

 2018  Anesthesia Event  Gastroenterology  Sera Cuevas CRNA  

 

 2018  Procedure Pass  Gastroenterology    

 

 2018  Surgery  Gastroenterology  Jonathon Bowden  UPPER ENDOSCOPY



       Kal Alaniz MD  

 

 2018  [facility]  Cardiology  Erick Presley  Lower GI



 -  Encounter    Dmear Gao MD



  bleed;Acute blood



 2018      jax Angulo anemia;Other



       Vel Phillip MD



  hypotension;Adrenal



       Ronn Manzanares MD  ([facility]);Thrombocytope



         sachi ([facility]);Acquired



         hypothyroidism;Adre



         nal insufficiency



         ([facility])

 

 2018  Orders Only  General Internal    



     Medicine    



after 10/12/2017



Social History







 Tobacco Use  Types  Packs/Day  Years Used  Date

 

 Never Assessed        









 Sex Assigned at Birth  Date Recorded

 

 Not on file  







Last Filed Vital Signs







 Vital Sign  Reading  Time Taken

 

 Blood Pressure  103/56  2018 12:19 PM CDT

 

 Pulse  62  2018 12:19 PM CDT

 

 Temperature  36 C (96.8 F)  2018 12:19 PM CDT

 

 Respiratory Rate  20  2018 12:19 PM CDT

 

 Oxygen Saturation  98%  2018 12:19 PM CDT

 

 Inhaled Oxygen Concentration  -  -

 

 Weight  81.2 kg (179 lb 0.2 oz)  2018  5:34 AM CDT

 

 Height  170.2 cm (5' 7")  2018  2:37 PM CDT

 

 Body Mass Index  28.04  2018  5:34 AM CDT







Plan of Treatment

Not on file



Procedures







 Procedure Name  Priority  Date/Time  Associated Diagnosis  Comments

 

 COLONOSCOPY    2018 11:00 AM CDT  Hematochezia  

 

 UPPER ENDOSCOPY    2018 10:00 AM CDT  Hematochezia  



after 10/12/2017



Results

RHYTHM STRIP - SCAN (2018  8:20 AM)POC-Glucose meter (2018 12:43 PM)
Only the most recent of3 resultswithin the time period is included.





 Component  Value  Ref Range

 

 POC-Glucose Meter  161 (H)Comment: TESTED AT [address]  70 - 
110 mg/dL



   [place] [address]  









 Specimen  Performing Laboratory

 

 Blood  [address] [address]



TSH/Free T4 If Indicated (2018  4:50 AM)





 Component  Value  Ref Range

 

 TSH  1.20  0.35 - 4.94 uIU/mL









 Specimen  Performing Laboratory

 

 Blood - Arm, Right  [address] [address]



Hemoglobin and hematocrit (2018  4:50 AM)Only the most recent of10 
resultswithin the time period is included.





 Component  Value  Ref Range

 

 Hemoglobin  8.6 (L)  13.7 - 17.5 GM/DL

 

 Hematocrit  27.0 (L)  40.1 - 51.0 %









 Specimen  Performing Laboratory

 

 Blood - Arm, [address] [address]



ACTH (2018  4:39 PM)





 Component  Value  Ref Range

 

 ACTH  <5 (L)  6 - 50 pg/mL



   Comment:  



     



   Reference range applies only to the specimens collected between 7am-10am.  









 Specimen  Performing Laboratory

 

 Blood  [facility]







   [facility]







   [address] [place] [address]









 Narrative

 

 Performing Lab







  [place]







  [facility]







  [address] [place] [address]







  DIANE Burger MD, PhD, JULIANA



CBC with platelet count + automated diff (2018  5:00 AM)Only the most 
recent of4 resultswithin the time period is included.





 Component  Value  Ref Range

 

 WBC  5.6  3.5 - 10.5 K/L

 

 RBC  2.47 (L)  4.63 - 6.08 M/L

 

 Hemoglobin  8.3 (L)  13.7 - 17.5 GM/DL

 

 Hematocrit  26.2 (L)  40.1 - 51.0 %

 

 MCV  106.1 (H)  79.0 - 92.2 fL

 

 MCH  33.6 (H)  25.7 - 32.2 pg

 

 MCHC  31.7 (L)  32.3 - 36.5 GM/DL

 

 RDW  14.5 (H)  11.6 - 14.4 %

 

 Platelets  125 (L)  150 - 450 K/CU MM

 

 MPV  9.5  9.4 - 12.4 fL

 

 nRBC  0  0 - 0 /100 WBC

 

 % Neutros  84  %

 

 % Lymphs  14  %

 

 % Monos  2  %

 

 % Eos  0  %

 

 % Baso  0  %

 

 # Neutros  4.72  1.78 - 5.38 K/L

 

 # Lymphs  0.78 (L)  1.32 - 3.57 K/L

 

 # Monos  0.11 (L)  0.30 - 0.82 K/L

 

 # Eos  0.00 (L)  0.04 - 0.54 K/L

 

 # Baso  0.00 (L)  0.01 - 0.08 K/L

 

 Immature Granulocytes-Relative  1  0 - 1 %









 Specimen  Performing Laboratory

 

 Blood - Arm, [address] [place] [address]



CBC with platelet count + automated diff (2018  5:00 AM)Only the most 
recent of4 resultswithin the time period is included.





 Specimen  Performing Laboratory

 

 Blood  









 Narrative

 

 The following orders were created for panel order CBC with platelet count + 
automated



 diff.







 Procedure



 Abnormality Status 







 ---------



 ----------- ------ 







 CBC with platelet count ...[[id number]]AbnormalFinal



 result 







  







 Please view results for these tests on the individual orders.



Magnesium (2018  5:00 AM)Only the most recent of3 resultswithin the time 
period is included.





 Component  Value  Ref Range

 

 Magnesium  1.9  1.6 - 2.6 mg/dL









 Specimen  Performing Laboratory

 

 Blood - Arm, Right  [address] [address]



Basic Metabolic Panel (2018  5:00 AM)Only the most recent of4 
resultswithin the time period is included.





 Component  Value  Ref Range

 

 Sodium  137  136 - 145 meq/L

 

 Potassium  4.3  3.5 - 5.1 meq/L

 

 Chloride  106  98 - 107 meq/L

 

 CO2  24  22 - 29 meq/L

 

 BUN  18  7 - 21 mg/dL

 

 Creatinine  1.00  0.57 - 1.25 mg/dL

 

 Glucose  111 (H)  70 - 105 mg/dL

 

 Calcium  9.3  8.4 - 10.2 mg/dL

 

 EGFR  85Comment: ESTIMATED GFR IS NOT AS ACCURATE AS  mL/min/1.73 sq m



   CREATININE CLEARANCE IN PREDICTING GLOMERULAR FILTRATION  



   RATE. ESTIMATED GFR IS NOT APPLICABLE FOR DIALYSIS  



   PATIENTS.  









 Specimen  Performing Laboratory

 

 Blood - Arm, Right  [address] [address]



REPORT OF PROCEDURE - ENDOSCOPY URL (2018  3:23 PM)Only the most recent 
of2 resultswithin the time period is included.Cortisol (2018  1:31 PM)





 Component  Value  Ref Range

 

 Cortisol, Total  4.9  3.7 - 19.4 ug/dL









 Specimen  Performing Laboratory

 

 Blood - Line, Venous  [address] [address]



ECG 12 lead (2018 11:51 PM)





 Specimen  Performing Laboratory

 

   GE MUSE









 Narrative

 

 Ventricular Rate 92 BPM







 Atrial Rate 125 BPM







 QRS Duration 94 ms







 Q-T Interval 364 ms







 QTC Calculation(Bazett) 450 ms







 R Axis -37 degrees







 T Axis 79 degrees







  







 Atrial fibrillation with premature ventricular or aberrantly conducted 
complexes







 Left axis deviation







 Poor R wave progression Cannot rule out Anterior infarct , age undetermined







 Abnormal ECG







 No previous ECGs available







 Confirmed by ELIZA MATHIS BASANT () on 2018 8:07:30 AM









 Procedure Note

 

 Interface, External Ris In - 2018  8:07 AM CDT



Ventricular Rate 92 BPM



 Atrial Rate 125 BPM



 QRS Duration 94 ms



 Q-T Interval 364 ms



 QTC Calculation(Bazett) 450 ms



 R Axis -37 degrees



 T Axis 79 degrees



 



 Atrial fibrillation with premature ventricular or aberrantly conducted 
complexes



 Left axis deviation



 Poor R wave progression Cannot rule out Anterior infarct , age undetermined



 Abnormal ECG



 No previous ECGs available



 Confirmed by ELIZA MATHIS BASANT () on 2018 8:07:30 AM



Lactic acid, venous, whole blood (2018  6:56 PM)





 Component  Value  Ref Range

 

 Lactate, Venous  1.3  0.5 - 2.2 mmol/L









 Specimen  Performing Laboratory

 

 Blood  [address] [address]









 Narrative

 

  







 Effective 2016: Units/Reference Range Change







 New: 0.5-2.2 mmol/LPrevious: 5-20 mg/dL



Prothrombin time/INR (2018  6:56 PM)





 Component  Value  Ref Range

 

 Protime  16.6 (H)  11.7 - 14.7 seconds

 

 INR  1.3  <=5.9









 Specimen  Performing Laboratory

 

 Blood  [address] [address]









 Narrative

 

  







 RECOMMENDED COUMADIN/WARFARIN INR THERAPY RANGES







 STANDARD DOSE: 2.0 - 3.0 Includes: PROPHYLAXIS for venous thrombosis, 
systemic



 embolization; TREATMENT for venous thrombosis and/or pulmonary embolus.







 HIGH RISK: Target INR is 2.5-3.5 for patients with mechanical heart valves.



after 10/12/2017

## 2018-10-13 NOTE — RAD REPORT
EXAM DESCRIPTION:  RAD - Elbow Right 3 View - 10/13/2018 1:41 pm

 

CLINICAL HISTORY:  Fall, elbow pain

 

COMPARISON:  None.

 

FINDINGS:  No fracture is identified and no elevated posterior fat pad. No dislocation or periosteal 
reaction. No foreign body seen. Patient does have degenerative change at the elbow joint. No other si
gnificant finding.

 

IMPRESSION:  Degenerative change without acute bone or joint finding.

 

Soft tissue injuries without air or foreign body.

## 2018-10-13 NOTE — XMS REPORT
Patient Summary Document

 Created on:2018



Patient:BRAULIO GRANT

Sex:Male

:1929

External Reference #:[id number]





Demographics







 Address  [address] [place] [address]

 

 Home Phone  [phone]

 

 Email Address  NONE

 

 Preferred Language  Unknown

 

 Marital Status  Unknown

 

 Oriental orthodox Affiliation  Unknown

 

 Race  Unknown

 

 Additional Race(s)  Unavailable

 

 Ethnic Group  Unknown









Author







 Organization  [facility]

 

 Address  [address]



   [place] [address]

 

 Phone  [phone]









Care Team Providers







 Name  Role  Phone

 

 JERMAINE FELICIANO  Unavailable  Unavailable









Problems

This patient has no known problems.



Allergies, Adverse Reactions, Alerts

This patient has no known allergies or adverse reactions.



Medications

This patient has no known medications.



Results







 Test Description  Test Time  Test Comments  Text Results  Atomic Results  
Result Comments









 POCT-GLUCOSE METER  2018 12:49:00    









   Test Item  Value  Reference Range  Comments









 POC-GLUCOSE METER (BEAKER) (test  161 mg/dL    TESTED AT [address]



 icgq=9608)      [place] [id number]



POCT-GLUCOSE [id number] 08:10:00





 Test Item  Value  Reference Range  Comments

 

 POC-GLUCOSE METER (BEAKER)  150 mg/dL    TESTED AT [address]



 (test nzhe=6678)      [place] [id number]



TSH/FREE T4 IF [id number] 05:49:00





 Test Item  Value  Reference Range  Comments

 

 THYROID STIMULATING HORMONE (BEAKER) (test  1.20 uIU/mL  0.35-4.94  



 code=772)      



HEMOGLOBIN AND [id number] 05:27:00





 Test Item  Value  Reference Range  Comments

 

 HEMOGLOBIN (BEAKER) (test code=410)  8.6 GM/DL  13.7-17.5  

 

 HEMATOCRIT (BEAKER) (test code=411)  27.0 %  40.1-51.0  



POCT-GLUCOSE [id number] 22:03:00





 Test Item  Value  Reference Range  Comments

 

 POC-GLUCOSE METER (BEAKER)  144 mg/dL    TESTED AT [address]



 (test xboi=1562)      [place] [id number]



HEMOGLOBIN AND [id number] 09:58:00





 Test Item  Value  Reference Range  Comments

 

 HEMOGLOBIN (BEAKER) (test code=410)  9.4 GM/DL  13.7-17.5  

 

 HEMATOCRIT (BEAKER) (test code=411)  29.2 %  40.1-51.0  



[id number] 05:40:00





 Test Item  Value  Reference Range  Comments

 

 MAGNESIUM (BEAKER) (test code=627)  1.9 mg/dL  1.6-2.6  



BASIC METABOLIC [id number] 05:40:00





 Test Item  Value  Reference Range  Comments

 

 SODIUM (BEAKER) (test  137 meq/L  136-145  



 ktrc=584)      

 

 POTASSIUM (BEAKER) (test  4.3 meq/L  3.5-5.1  



 code=379)      

 

 CHLORIDE (BEAKER) (test  106 meq/L    



 code=382)      

 

 CO2 (BEAKER) (test  24 meq/L  22-29  



 code=355)      

 

 BLOOD UREA NITROGEN  18 mg/dL  7-21  



 (BEAKER) (test code=354)      

 

 CREATININE (BEAKER) (test  1.00 mg/dL  0.57-1.25  



 code=358)      

 

 GLUCOSE RANDOM (BEAKER)  111 mg/dL    



 (test code=652)      

 

 CALCIUM (BEAKER) (test  9.3 mg/dL  8.4-10.2  



 code=697)      

 

 EGFR (BEAKER) (test  85 mL/min/1.73 sq m    ESTIMATED GFR IS NOT AS



 code=1092)      ACCURATE AS CREATININE



       CLEARANCE IN PREDICTING



       GLOMERULAR FILTRATION



       RATE. ESTIMATED GFR IS



       NOT APPLICABLE FOR



       DIALYSIS PATIENTS.



CBC W/PLT COUNT &amp; AUTO [id number] 05:17:00





 Test Item  Value  Reference Range  Comments

 

 WHITE BLOOD CELL COUNT (BEAKER) (test code=775)  5.6 K/ L  3.5-10.5  

 

 RED BLOOD CELL COUNT (BEAKER) (test code=761)  2.47 M/ L  4.63-6.08  

 

 HEMOGLOBIN (BEAKER) (test code=410)  8.3 GM/DL  13.7-17.5  

 

 HEMATOCRIT (BEAKER) (test code=411)  26.2 %  40.1-51.0  

 

 MEAN CORPUSCULAR VOLUME (BEAKER) (test code=753)  106.1 fL  79.0-92.2  

 

 MEAN CORPUSCULAR HEMOGLOBIN (BEAKER) (test  33.6 pg  25.7-32.2  



 tdaj=395)      

 

 MEAN CORPUSCULAR HEMOGLOBIN CONC (BEAKER) (test  31.7 GM/DL  32.3-36.5  



 code=752)      

 

 RED CELL DISTRIBUTION WIDTH (BEAKER) (test  14.5 %  11.6-14.4  



 code=412)      

 

 PLATELET COUNT (BEAKER) (test code=756)  125 K/CU MM  150-450  

 

 MEAN PLATELET VOLUME (BEAKER) (test code=754)  9.5 fL  9.4-12.4  

 

 NUCLEATED RED BLOOD CELLS (BEAKER) (test  0 /100 WBC  0-0  



 code=413)      

 

 NEUTROPHILS RELATIVE PERCENT (BEAKER) (test  84 %    



 code=429)      

 

 LYMPHOCYTES RELATIVE PERCENT (BEAKER) (test  14 %    



 code=430)      

 

 MONOCYTES RELATIVE PERCENT (BEAKER) (test  2 %    



 code=431)      

 

 EOSINOPHILS RELATIVE PERCENT (BEAKER) (test  0 %    



 code=432)      

 

 BASOPHILS RELATIVE PERCENT (BEAKER) (test  0 %    



 code=437)      

 

 NEUTROPHILS ABSOLUTE COUNT (BEAKER) (test  4.72 K/ L  1.78-5.38  



 code=670)      

 

 LYMPHOCYTES ABSOLUTE COUNT (BEAKER) (test  0.78 K/ L  1.32-3.57  



 code=414)      

 

 MONOCYTES ABSOLUTE COUNT (BEAKER) (test  0.11 K/ L  0.30-0.82  



 code=415)      

 

 EOSINOPHILS ABSOLUTE COUNT (BEAKER) (test  0.00 K/ L  0.04-0.54  



 code=416)      

 

 BASOPHILS ABSOLUTE COUNT (BEAKER) (test  0.00 K/ L  0.01-0.08  



 code=417)      

 

 IMMATURE GRANULOCYTES-RELATIVE PERCENT (BEAKER)  1 %  0-1  



 (test code=2801)      



HEMOGLOBIN AND [id number] 20:34:00





 Test Item  Value  Reference Range  Comments

 

 HEMOGLOBIN (BEAKER) (test code=410)  8.6 GM/DL  13.7-17.5  

 

 HEMATOCRIT (BEAKER) (test code=411)  26.9 %  40.1-51.0  



[id number] 14:28:00





 Test Item  Value  Reference Range  Comments

 

 CORTISOL, TOTAL (BEAKER) (test code=0236)  4.9 ug/dL  3.7-19.4  



HEMOGLOBIN AND [id number] 13:45:00





 Test Item  Value  Reference Range  Comments

 

 HEMOGLOBIN (BEAKER) (test code=410)  8.2 GM/DL  13.7-17.5  

 

 HEMATOCRIT (BEAKER) (test code=411)  25.2 %  40.1-51.0  



[id number] 05:05:00





 Test Item  Value  Reference Range  Comments

 

 MAGNESIUM (BEAKER) (test code=627)  1.7 mg/dL  1.6-2.6  



BASIC METABOLIC [id number] 05:05:00





 Test Item  Value  Reference Range  Comments

 

 SODIUM (BEAKER) (test  141 meq/L  136-145  



 jesb=693)      

 

 POTASSIUM (BEAKER) (test  3.5 meq/L  3.5-5.1  



 code=379)      

 

 CHLORIDE (BEAKER) (test  109 meq/L    



 code=382)      

 

 CO2 (BEAKER) (test  26 meq/L  22-29  



 code=355)      

 

 BLOOD UREA NITROGEN  24 mg/dL  7-21  



 (BEAKER) (test code=354)      

 

 CREATININE (BEAKER) (test  0.93 mg/dL  0.57-1.25  



 code=358)      

 

 GLUCOSE RANDOM (BEAKER)  91 mg/dL    



 (test code=652)      

 

 CALCIUM (BEAKER) (test  9.0 mg/dL  8.4-10.2  



 code=697)      

 

 EGFR (BEAKER) (test  93 mL/min/1.73 sq m    ESTIMATED GFR IS NOT AS



 code=1092)      ACCURATE AS CREATININE



       CLEARANCE IN PREDICTING



       GLOMERULAR FILTRATION



       RATE. ESTIMATED GFR IS



       NOT APPLICABLE FOR



       DIALYSIS PATIENTS.



CBC W/PLT COUNT &amp; AUTO [id number] 05:01:00





 Test Item  Value  Reference Range  Comments

 

 WHITE BLOOD CELL COUNT (BEAKER) (test code=775)  5.5 K/ L  3.5-10.5  

 

 RED BLOOD CELL COUNT (BEAKER) (test code=761)  2.26 M/ L  4.63-6.08  

 

 HEMOGLOBIN (BEAKER) (test code=410)  7.6 GM/DL  13.7-17.5  

 

 HEMATOCRIT (BEAKER) (test code=411)  23.9 %  40.1-51.0  

 

 MEAN CORPUSCULAR VOLUME (BEAKER) (test code=753)  105.8 fL  79.0-92.2  

 

 MEAN CORPUSCULAR HEMOGLOBIN (BEAKER) (test  33.6 pg  25.7-32.2  



 lmmj=148)      

 

 MEAN CORPUSCULAR HEMOGLOBIN CONC (BEAKER) (test  31.8 GM/DL  32.3-36.5  



 code=752)      

 

 RED CELL DISTRIBUTION WIDTH (BEAKER) (test  15.0 %  11.6-14.4  



 code=412)      

 

 PLATELET COUNT (BEAKER) (test code=756)  123 K/CU MM  150-450  

 

 MEAN PLATELET VOLUME (BEAKER) (test code=754)  10.0 fL  9.4-12.4  

 

 NUCLEATED RED BLOOD CELLS (BEAKER) (test  0 /100 WBC  0-0  



 code=413)      

 

 NEUTROPHILS RELATIVE PERCENT (BEAKER) (test  59 %    



 code=429)      

 

 LYMPHOCYTES RELATIVE PERCENT (BEAKER) (test  28 %    



 code=430)      

 

 MONOCYTES RELATIVE PERCENT (BEAKER) (test  10 %    



 code=431)      

 

 EOSINOPHILS RELATIVE PERCENT (BEAKER) (test  3 %    



 code=432)      

 

 BASOPHILS RELATIVE PERCENT (BEAKER) (test  1 %    



 code=437)      

 

 NEUTROPHILS ABSOLUTE COUNT (BEAKER) (test  3.21 K/ L  1.78-5.38  



 code=670)      

 

 LYMPHOCYTES ABSOLUTE COUNT (BEAKER) (test  1.53 K/ L  1.32-3.57  



 code=414)      

 

 MONOCYTES ABSOLUTE COUNT (BEAKER) (test  0.56 K/ L  0.30-0.82  



 code=415)      

 

 EOSINOPHILS ABSOLUTE COUNT (BEAKER) (test  0.15 K/ L  0.04-0.54  



 code=416)      

 

 BASOPHILS ABSOLUTE COUNT (BEAKER) (test  0.03 K/ L  0.01-0.08  



 code=417)      

 

 IMMATURE GRANULOCYTES-RELATIVE PERCENT (BEAKER)  0 %  0-1  



 (test code=2801)      



HEMOGLOBIN AND [id number] 00:57:00





 Test Item  Value  Reference Range  Comments

 

 HEMOGLOBIN (BEAKER) (test code=410)  8.1 GM/DL  13.7-17.5  

 

 HEMATOCRIT (BEAKER) (test code=411)  25.5 %  40.1-51.0  



HEMOGLOBIN AND [id number] 19:06:00





 Test Item  Value  Reference Range  Comments

 

 HEMOGLOBIN (BEAKER) (test code=410)  7.5 GM/DL  13.7-17.5  

 

 HEMATOCRIT (BEAKER) (test code=411)  24.0 %  40.1-51.0  



HEMOGLOBIN AND [id number] 07:42:00





 Test Item  Value  Reference Range  Comments

 

 HEMOGLOBIN (BEAKER) (test code=410)  8.2 GM/DL  13.7-17.5  

 

 HEMATOCRIT (BEAKER) (test code=411)  26.0 %  40.1-51.0  



[id number] 05:34:00





 Test Item  Value  Reference Range  Comments

 

 MAGNESIUM (BEAKER) (test  2.1 mg/dL  1.6-2.6  Specimen slightly hemolyzed



 code=627)      



BASIC METABOLIC [id number] 05:34:00





 Test Item  Value  Reference Range  Comments

 

 SODIUM (BEAKER) (test  139 meq/L  136-145  



 nfad=112)      

 

 POTASSIUM (BEAKER) (test  4.4 meq/L  3.5-5.1  Specimen slightly



 code=379)      hemolyzed

 

 CHLORIDE (BEAKER) (test  109 meq/L    



 code=382)      

 

 CO2 (BEAKER) (test  22 meq/L  22-29  



 code=355)      

 

 BLOOD UREA NITROGEN  32 mg/dL  7-21  



 (BEAKER) (test code=354)      

 

 CREATININE (BEAKER) (test  1.10 mg/dL  0.57-1.25  Specimen slightly



 code=358)      hemolyzed

 

 GLUCOSE RANDOM (BEAKER)  83 mg/dL    



 (test code=652)      

 

 CALCIUM (BEAKER) (test  9.3 mg/dL  8.4-10.2  



 code=697)      

 

 EGFR (BEAKER) (test  76 mL/min/1.73 sq m    ESTIMATED GFR IS NOT AS



 code=1092)      ACCURATE AS CREATININE



       CLEARANCE IN PREDICTING



       GLOMERULAR FILTRATION



       RATE. ESTIMATED GFR IS



       NOT APPLICABLE FOR



       DIALYSIS PATIENTS.



CBC W/PLT COUNT &amp; AUTO [id number] 05:03:00





 Test Item  Value  Reference Range  Comments

 

 WHITE BLOOD CELL COUNT (BEAKER) (test code=775)  6.7 K/ L  3.5-10.5  

 

 RED BLOOD CELL COUNT (BEAKER) (test code=761)  2.51 M/ L  4.63-6.08  

 

 HEMOGLOBIN (BEAKER) (test code=410)  8.4 GM/DL  13.7-17.5  

 

 HEMATOCRIT (BEAKER) (test code=411)  27.1 %  40.1-51.0  

 

 MEAN CORPUSCULAR VOLUME (BEAKER) (test code=753)  108.0 fL  79.0-92.2  

 

 MEAN CORPUSCULAR HEMOGLOBIN (BEAKER) (test  33.5 pg  25.7-32.2  



 njkl=955)      

 

 MEAN CORPUSCULAR HEMOGLOBIN CONC (BEAKER) (test  31.0 GM/DL  32.3-36.5  



 code=752)      

 

 RED CELL DISTRIBUTION WIDTH (BEAKER) (test  15.2 %  11.6-14.4  



 code=412)      

 

 PLATELET COUNT (BEAKER) (test code=756)  133 K/CU MM  150-450  

 

 MEAN PLATELET VOLUME (BEAKER) (test code=754)  10.0 fL  9.4-12.4  

 

 NUCLEATED RED BLOOD CELLS (BEAKER) (test  0 /100 WBC  0-0  



 code=413)      

 

 NEUTROPHILS RELATIVE PERCENT (BEAKER) (test  66 %    



 code=429)      

 

 LYMPHOCYTES RELATIVE PERCENT (BEAKER) (test  23 %    



 code=430)      

 

 MONOCYTES RELATIVE PERCENT (BEAKER) (test  9 %    



 code=431)      

 

 EOSINOPHILS RELATIVE PERCENT (BEAKER) (test  2 %    



 code=432)      

 

 BASOPHILS RELATIVE PERCENT (BEAKER) (test  1 %    



 code=437)      

 

 NEUTROPHILS ABSOLUTE COUNT (BEAKER) (test  4.40 K/ L  1.78-5.38  



 code=670)      

 

 LYMPHOCYTES ABSOLUTE COUNT (BEAKER) (test  1.53 K/ L  1.32-3.57  



 code=414)      

 

 MONOCYTES ABSOLUTE COUNT (BEAKER) (test  0.57 K/ L  0.30-0.82  



 code=415)      

 

 EOSINOPHILS ABSOLUTE COUNT (BEAKER) (test  0.13 K/ L  0.04-0.54  



 code=416)      

 

 BASOPHILS ABSOLUTE COUNT (BEAKER) (test  0.05 K/ L  0.01-0.08  



 code=417)      

 

 IMMATURE GRANULOCYTES-RELATIVE PERCENT (BEAKER)  0 %  0-1  



 (test code=2801)      



HEMOGLOBIN AND [id number] 00:58:00





 Test Item  Value  Reference Range  Comments

 

 HEMOGLOBIN (BEAKER) (test code=410)  8.7 GM/DL  13.7-17.5  

 

 HEMATOCRIT (BEAKER) (test code=411)  27.8 %  40.1-51.0  



HEMOGLOBIN AND [id number] 20:47:00





 Test Item  Value  Reference Range  Comments

 

 HEMOGLOBIN (BEAKER) (test code=410)  8.5 GM/DL  13.7-17.5  

 

 HEMATOCRIT (BEAKER) (test code=411)  27.2 %  40.1-51.0  



CBC W/PLT COUNT &amp; AUTO [id number] 20:47:00





 Test Item  Value  Reference Range  Comments

 

 WHITE BLOOD CELL COUNT (BEAKER) (test code=775)  4.8 K/ L  3.5-10.5  

 

 RED BLOOD CELL COUNT (BEAKER) (test code=761)  2.51 M/ L  4.63-6.08  

 

 HEMOGLOBIN (BEAKER) (test code=410)  8.5 GM/DL  13.7-17.5  

 

 HEMATOCRIT (BEAKER) (test code=411)  27.2 %  40.1-51.0  

 

 MEAN CORPUSCULAR VOLUME (BEAKER) (test code=753)  108.4 fL  79.0-92.2  

 

 MEAN CORPUSCULAR HEMOGLOBIN (BEAKER) (test  33.9 pg  25.7-32.2  



 bscv=623)      

 

 MEAN CORPUSCULAR HEMOGLOBIN CONC (BEAKER) (test  31.3 GM/DL  32.3-36.5  



 code=752)      

 

 RED CELL DISTRIBUTION WIDTH (BEAKER) (test  15.3 %  11.6-14.4  



 code=412)      

 

 PLATELET COUNT (BEAKER) (test code=756)  127 K/CU MM  150-450  

 

 MEAN PLATELET VOLUME (BEAKER) (test code=754)  9.5 fL  9.4-12.4  

 

 NUCLEATED RED BLOOD CELLS (BEAKER) (test  0 /100 WBC  0-0  



 code=413)      

 

 NEUTROPHILS RELATIVE PERCENT (BEAKER) (test  57 %    



 code=429)      

 

 LYMPHOCYTES RELATIVE PERCENT (BEAKER) (test  29 %    



 code=430)      

 

 MONOCYTES RELATIVE PERCENT (BEAKER) (test  11 %    



 code=431)      

 

 EOSINOPHILS RELATIVE PERCENT (BEAKER) (test  3 %    



 code=432)      

 

 BASOPHILS RELATIVE PERCENT (BEAKER) (test  1 %    



 code=437)      

 

 NEUTROPHILS ABSOLUTE COUNT (BEAKER) (test  2.72 K/ L  1.78-5.38  



 code=670)      

 

 LYMPHOCYTES ABSOLUTE COUNT (BEAKER) (test  1.37 K/ L  1.32-3.57  



 code=414)      

 

 MONOCYTES ABSOLUTE COUNT (BEAKER) (test  0.53 K/ L  0.30-0.82  



 code=415)      

 

 EOSINOPHILS ABSOLUTE COUNT (BEAKER) (test  0.13 K/ L  0.04-0.54  



 code=416)      

 

 BASOPHILS ABSOLUTE COUNT (BEAKER) (test  0.04 K/ L  0.01-0.08  



 code=417)      

 

 IMMATURE GRANULOCYTES-RELATIVE PERCENT (BEAKER)  0 %  0-1  



 (test code=2801)      



BASIC METABOLIC [id number] 19:38:00





 Test Item  Value  Reference Range  Comments

 

 SODIUM (BEAKER) (test  139 meq/L  136-145  



 svvu=421)      

 

 POTASSIUM (BEAKER) (test  4.4 meq/L  3.5-5.1  



 code=379)      

 

 CHLORIDE (BEAKER) (test  110 meq/L    



 code=382)      

 

 CO2 (BEAKER) (test  24 meq/L  22-29  



 code=355)      

 

 BLOOD UREA NITROGEN  29 mg/dL  7-21  



 (BEAKER) (test code=354)      

 

 CREATININE (BEAKER) (test  1.13 mg/dL  0.57-1.25  



 code=358)      

 

 GLUCOSE RANDOM (BEAKER)  82 mg/dL    



 (test code=652)      

 

 CALCIUM (BEAKER) (test  9.5 mg/dL  8.4-10.2  



 code=697)      

 

 EGFR (BEAKER) (test  74 mL/min/1.73 sq m    ESTIMATED GFR IS NOT AS



 code=1092)      ACCURATE AS CREATININE



       CLEARANCE IN PREDICTING



       GLOMERULAR FILTRATION



       RATE. ESTIMATED GFR IS



       NOT APPLICABLE FOR



       DIALYSIS PATIENTS.



LACTIC ACID, VENOUS, WHOLE [id number] 19:34:00





 Test Item  Value  Reference Range  Comments

 

 LACTATE BLOOD VENOUS (2) (BEAKER) (test  1.3 mmol/L  0.5-2.2  



 code=2872)      



Effective 2016: Units/Reference Range ChangeNew: 0.5-2.2 mmol/L  Previous: 5
-20 mg/dLPROTHROMBIN TIME/[id number] 19:27:00





 Test Item  Value  Reference Range  Comments

 

 PROTIME (BEAKER) (test code=759)  16.6 seconds  11.7-14.7  

 

 INR (BEAKER) (test code=370)  1.3  <=5.9  



RECOMMENDED COUMADIN/WARFARIN INR THERAPY RANGESSTANDARD DOSE: 2.0 - 3.0   
Includes: PROPHYLAXIS forvenous thrombosis, systemic embolization; TREATMENT 
for venous thrombosis and/or pulmonary embolus.HIGH RISK: Target INR is 2.5-3.5 
for patients with mechanical heart valves.HEMOGLOBIN AND [id number] 18
:03:00





 Test Item  Value  Reference Range  Comments

 

 HEMOGLOBIN (BEAKER) (test code=410)  9.2 GM/DL  13.7-17.5  

 

 HEMATOCRIT (BEAKER) (test code=411)  29.3 %  40.1-51.0

## 2018-10-13 NOTE — EDPHYS
Physician Documentation                                                                           

 [facility]                                                                

Name: Jos Aguayo Jr                                                                              

Age: 89 yrs                                                                                       

Sex: Male                                                                                         

: 1929                                                                                   

MRN: [id number]                                                                                   

Arrival Date: 10/13/2018                                                                          

Time: 11:16                                                                                       

Account#: [id number]                                                                            

Bed 18                                                                                            

Private MD:                                                                                       

ED Physician Santino Mcnair                                                                      

HPI:                                                                                              

10/13                                                                                             

11:40 This 89 yrs old  Male presents to ER via Ambulatory with complaints of Fall    jmm 

      Injury.                                                                                     

11:40 Details of fall: The patient fell from seated position. Onset: The symptoms/episode     jmm 

      began/occurred acutely, just prior to arrival. Associated injuries: The patient             

      sustained right and left arm. This is an 89 year old male that presents to the ED with      

      lacerations to his right and left arm after a fall which occurred just prior to             

      arrival. patient states he was applying lotion to his left side while in his wheel          

      chair and states the wheel chair slipped with the patient falling forward, scraping his     

      arms against the wheelchair locks. Patient states he landed on his buttocks. Denies         

      hitting his head, denies neck pain. .                                                       

                                                                                                  

Historical:                                                                                       

- Allergies:                                                                                      

11:20 No Known Allergies;                                                                     hj  

- Home Meds:                                                                                      

11:20 aspirin 81 mg Oral TbEC 1 tab once daily [Active]; levothyroxine 112 mcg tab 1 tab once hj  

      daily [Active]; Plavix 75 mg Oral tab 1 tab once daily [Active]; simvastatin 20 mg Oral     

      tab 1 tab once daily [Active]; Vitamin B-12 Oral 2500 IU daily [Active];                    

- PMHx:                                                                                           

11:20 Atrial Fib; Hyperlipidemia; Hypertension; Hypothyroidism; Myocardial infarction;        hj  

      Osteoporosis;                                                                               

- PSHx:                                                                                           

11:20 Hernia repair; Bypass;                                                                  hj  

                                                                                                  

- Immunization history:: Adult Immunizations up to date.                                          

- Social history:: Smoking status: Patient/guardian denies using tobacco,                         

  Patient/guardian denies using alcohol.                                                          

- Immunization history: Last tetanus immunization: unknown.                                       

- Ebola Screening: : Patient negative for fever greater than or equal to 101.5 degrees            

  Fahrenheit, and additional compatible Ebola Virus Disease symptoms Patient denies               

  exposure to infectious person Patient denies travel to an Ebola-affected area in the            

  21 days before illness onset.                                                                   

                                                                                                  

                                                                                                  

ROS:                                                                                              

11:40 Constitutional: Negative for fever, chills, and weight loss, Cardiovascular: Negative   jmm 

      for chest pain, palpitations, and edema, Respiratory: Negative for shortness of breath,     

      cough, wheezing, and pleuritic chest pain.                                                  

11:40 MS/extremity: Positive for laceration.                                                      

11:40 Skin: Positive for laceration(s).                                                           

11:40 All other systems are negative.                                                             

                                                                                                  

Exam:                                                                                             

11:40 Constitutional:  This is a well developed, well nourished patient who is awake, alert,  jmm 

      and in no acute distress. Head/Face:  atraumatic. Eyes:  EOMI, no conjunctival erythema     

      appreciated ENT:  Moist Mucus Membranes Neck:  Trachea midline, Supple Chest/axilla:        

      Normal chest wall appearance and motion.   Cardiovascular:  Regular rate and rhythm.        

      No edema appreciated Respiratory:  Normal respirations, no respiratory distress             

      appreciated                                                                                 

11:40 Neck: C-spine: appears grossly normal, no vertebral tenderness, no crepitus.                

11:40 Musculoskeletal/extremity: ROM: intact in all extremities.                                  

11:40 Musculoskeletal/extremity: FROM appreciated to both extremities, no bony tenderness is      

      appreciated, compartments are soft, NVI.                                                    

11:40 Skin: 8 cm laceration noted to the right distal humeral region of the right arm, mild       

      bleeding appreciated, a skin tear is noted to the left forearm.  .                          

11:40 Neuro: Orientation: is normal, Mentation: is normal, Memory: is normal.                     

11:40 Psych: Behavior/mood is pleasant, cooperative.                                              

                                                                                                  

Vital Signs:                                                                                      

11:21  / 75; Pulse 84; Resp 18; Temp 98.1(O); Pulse Ox 98% on R/A; Weight 73.03 kg;     hj  

      Height 5 ft. 7 in. (170.18 cm); Pain 5/10;                                                  

12:56  / 75; Pulse 69; Resp 18; Pulse Ox 100% on R/A;                                   hj  

13:07  / 63; Pulse 63; Resp 18; Pulse Ox 100% on R/A;                                   hj  

13:45  / 76; Pulse 79; Resp 18; Pulse Ox 99% on R/A;                                    hj  

14:30  / 80; Pulse 75; Resp 18; Pulse Ox 100% on R/A;                                   hj  

11:21 Body Mass Index 25.22 (73.03 kg, 170.18 cm)                                               

                                                                                                  

[place] Coma Score:                                                                               

11:21 Eye Response: spontaneous(4). Verbal Response: oriented(5). Motor Response: obeys         

      commands(6). Total: 15.                                                                     

                                                                                                  

Trauma Score (Adult):                                                                             

11:21 Eye Response: spontaneous(1); Verbal Response: oriented(1); Motor Response: obeys       hj  

      commands(2); Systolic BP: > 89 mm Hg(4); Respiratory Rate: 10 to 29 per min(4); [place]     

      Score: 15; Trauma Score: 12                                                                 

                                                                                                  

Laceration:                                                                                       

14:13 Wound Repair of 8cm ( 3.1in ) subcutaneous laceration to right antecubital area. Distal jmm 

      neuro/vascular/tendon intact. Anesthesia: Local anesthetic administered with 5 mls of       

      1% lidocaine. Wound prep: Simple cleansing with betadine by me, Extensive cleansing,        

      Wound irrigation with saline by nurse by me. Skin closed with 8 4-0 Prolene using           

      simple sutures and sterile technique. Patient tolerated well.                               

                                                                                                  

MDM:                                                                                              

11:25 Patient medically screened.                                                             [facility] 

14:13 Data reviewed: vital signs, nurses notes. Counseling: I had a detailed discussion with  kerline 

      the patient and/or guardian regarding: the historical points, exam findings, and any        

      diagnostic results supporting the discharge/admit diagnosis, the need for outpatient        

      follow up, to return to the emergency department if symptoms worsen or persist or if        

      there are any questions or concerns that arise at home.                                     

                                                                                                  

10/13                                                                                             

11:38 Order name: Elbow Right 3 View XRAY; Complete Time: 14:13                               [facility] 

10/13                                                                                             

11:38 Order name: Forearm Left XRAY; Complete Time: 14:13                                     [facility] 

                                                                                                  

Administered Medications:                                                                         

13:45 Drug: Lidocaine (1 %) 20 ml Volume: 20 ml; Route: Infiltration;                         hj  

                                                                                                  

                                                                                                  

Disposition:                                                                                      

14:14 Chart complete.                                                                         [facility] 

14:43 Co-signature as Attending Physician, Santino Mcnair MD I agree with the assessment and  [facility] 

      plan of care.                                                                               

                                                                                                  

Disposition:                                                                                      

10/13/18 14:15 Discharged to Home. Impression: right arm laceration.                              

- Condition is Stable.                                                                            

- Discharge Instructions: Laceration Care, Adult.                                                 

- Prescriptions for Cephalexin 500 mg Oral Capsule - take 1 capsule by ORAL route every           

  6 hours for 10 days; 40 capsule.                                                                

- Medication Reconciliation Form, Thank You Letter, Antibiotic Education, Prescription            

  Opioid Use form.                                                                                

- Follow up: Private Physician; When: 2 - 3 days; Reason: Recheck today's complaints,             

  Continuance of care, Re-evaluation by your physician.                                           

                                                                                                  

                                                                                                  

                                                                                                  

Signatures:                                                                                       

Dispatcher [employer]                           Santino Mills MD MD cha Mickail, Joel, PA PA jmm Joaquin, Iron, RN                      RN   hj                                                   

                                                                                                  

Corrections: (The following items were deleted from the chart)                                    

14:36 14:15 10/13/2018 14:15 Discharged to Home. Impression: right arm laceration. Condition  hj  

      is Stable. Forms are Medication Reconciliation Form, Thank You Letter, Antibiotic           

      Education, Prescription Opioid Use. Follow up: Private Physician; When: 2 - 3 days;         

      Reason: Recheck today's complaints, Continuance of care, Re-evaluation by your              

      physician. kerline                                                                              

                                                                                                  

**************************************************************************************************

## 2018-10-13 NOTE — ER
Nurse's Notes                                                                                     

 [facility]                                                                

Name: Jos Aguayo Jr                                                                              

Age: 89 yrs                                                                                       

Sex: Male                                                                                         

: 1929                                                                                   

MRN: [id number]                                                                                   

Arrival Date: 10/13/2018                                                                          

Time: 11:16                                                                                       

Account#: [id number]                                                                            

Bed 18                                                                                            

Private MD:                                                                                       

Diagnosis: right arm laceration                                                                   

                                                                                                  

Presentation:                                                                                     

10/13                                                                                             

11:16 Presenting complaint: EMS states: pt reports fall around 1045 am at home today, he was  hj  

      in his wheelchair forgot to lock it and stood up and caught his 2 arms on the               

      wheelchair and fell, denies hitting head and LOC; reports multiple abrasions on             

      bilateral arms; takes blood thinner; BP- 106/77; HR- 70; O2 sat- 100%;. Transition of       

      care: patient was not received from another setting of care. Onset of symptoms was          

      2018. Risk Assessment: Do you want to hurt yourself or someone else?            

      Patient reports no desire to harm self or others. Initial Sepsis Screen: Does the           

      patient meet any 2 criteria? No. Patient's initial sepsis screen is negative. Does the      

      patient have a suspected source of infection? No. Patient's initial sepsis screen is        

      negative. Care prior to arrival: None.                                                      

11:16 Method Of Arrival: Ambulatory                                                             

11:16 Acuity: LM 4                                                                             

11:24 Mechanism of Injury: Fall out of chair. Trauma event details: Injury occurred in the    [facility], Injury occurred: at home. Injury occurred: 2018 Injury      

      occurred at: 10:45.                                                                         

                                                                                                  

Triage Assessment:                                                                                

11:20 General: Appears in no apparent distress. uncomfortable, Behavior is calm, cooperative, hj  

      appropriate for age. Pain: Complains of pain in right arm and left arm.                     

                                                                                                  

Trauma Activation: Not Applicable                                                                 

 Physician: ED Physician; Name: ; Notified At: ; Arrived At:                                      

 Physician: General Surgeon; Name: ; Notified At: ; Arrived At:                                   

 Physician: Radiology; Name: ; Notified At: ; Arrived At:                                         

 Physician: Respiratory; Name: ; Notified At: ; Arrived At:                                       

 Physician: Lab; Name: ; Notified At: ; Arrived At:                                               

                                                                                                  

Historical:                                                                                       

- Allergies:                                                                                      

11:20 No Known Allergies;                                                                     hj  

- Home Meds:                                                                                      

11:20 aspirin 81 mg Oral TbEC 1 tab once daily [Active]; levothyroxine 112 mcg tab 1 tab once hj  

      daily [Active]; Plavix 75 mg Oral tab 1 tab once daily [Active]; simvastatin 20 mg Oral     

      tab 1 tab once daily [Active]; Vitamin B-12 Oral 2500 IU daily [Active];                    

- PMHx:                                                                                           

11:20 Atrial Fib; Hyperlipidemia; Hypertension; Hypothyroidism; Myocardial infarction;        hj  

      Osteoporosis;                                                                               

- PSHx:                                                                                           

11:20 Hernia repair; Bypass;                                                                  hj  

                                                                                                  

- Immunization history:: Adult Immunizations up to date.                                          

- Social history:: Smoking status: Patient/guardian denies using tobacco,                         

  Patient/guardian denies using alcohol.                                                          

- Immunization history: Last tetanus immunization: unknown.                                       

- Ebola Screening: : Patient negative for fever greater than or equal to 101.5 degrees            

  Fahrenheit, and additional compatible Ebola Virus Disease symptoms Patient denies               

  exposure to infectious person Patient denies travel to an Ebola-affected area in the            

  21 days before illness onset.                                                                   

                                                                                                  

                                                                                                  

Screenin:21 Abuse screen: Denies threats or abuse. Denies injuries from another. Nutritional        hj  

      screening: No deficits noted. Tuberculosis screening: No symptoms or risk factors           

      identified. Fall Risk Fall in past 12 months (25 points).                                   

                                                                                                  

Primary Survey:                                                                                   

11:18 A: Airway: patent, No supplemental oxygen in use on arrival. Oral cavity: clear, gag    hj  

      reflex present, Trachea midline. Breathing/Chest: Respiratory pattern: regular,             

      Respiratory effort: spontaneous, unlabored, Breath sounds: clear, Chest inspection:         

      symmetrical rise and fall of the chest. Circulation: Cardiac rhythm: sinus rhythm Heart     

      tones present. Pulses: palpable right radial artery and left radial artery. Skin color:     

      pink, Skin temperature: warm, dry. Disability Alert.                                        

11:23 Reassessment Airway Airway Patent Oxygen No O2 Oral cavity Clear +Gag reflex Trachea    hj  

      Midline Breathing/Chest Respiratory pattern Regular Respiratory effort Spontaneous          

      Unlabored Breath sounds Clear Chest inspection Symmetrical Circulation Heart rhythm         

      Sinus rhythm Heart tones Present Pulses Palpable Color Pink Temperature Warm Dry            

      Disability Alert.                                                                           

12:30 Reassessment Airway Airway Patent Oxygen No O2 Oral cavity Clear +Gag reflex Trachea    hj  

      Midline Breathing/Chest Respiratory pattern Regular Respiratory effort Spontaneous          

      Unlabored Breath sounds Clear Chest inspection Symmetrical Circulation Heart rhythm         

      Sinus rhythm Heart tones Present Pulses Palpable Color Pink Temperature Warm Dry            

      Disability Alert.                                                                           

14:30 Reassessment Airway Airway Patent Oxygen No O2 Oral cavity Clear +Gag reflex Trachea    hj  

      Midline Breathing/Chest Respiratory pattern Regular Respiratory effort Spontaneous          

      Unlabored Breath sounds Clear Chest inspection Symmetrical Circulation Heart rhythm         

      Sinus rhythm Heart tones Present Pulses Palpable Color Pink Temperature Warm Dry            

      Disability Alert.                                                                           

14:35 Reassessment Airway Airway Patent Oxygen No O2 Oral cavity Clear +Gag reflex Trachea    hj  

      Midline Breathing/Chest Respiratory pattern Regular Respiratory effort Spontaneous          

      Unlabored Breath sounds Clear Chest inspection Symmetrical Circulation Heart rhythm         

      Sinus rhythm Heart tones Present Pulses Palpable Color Pink Temperature Warm Dry            

      Disability Alert.                                                                           

                                                                                                  

Secondary Survey:                                                                                 

11:23 HEENT: No deficits noted. Gastrointestinal: No deficits noted. : No signs and/or      hj  

      symptoms were reported regarding the genitourinary system. Musculoskeletal: abrasion on     

      bilateral arms Reports pain in left arm and right arm.                                      

                                                                                                  

Assessment:                                                                                       

11:25 General: Appears in no apparent distress. comfortable, Behavior is calm, cooperative,   hj  

      appropriate for age. Pain: Complains of pain in right arm and left arm. Neuro: Level of     

      Consciousness is awake, alert, obeys commands, Oriented to person, place, time,             

      situation, Appropriate for age. EENT: No signs and/or symptoms were reported regarding      

      the EENT system. Cardiovascular: Capillary refill < 3 seconds Patient's skin is warm        

      and dry. Respiratory: Airway is patent Respiratory effort is even, unlabored,               

      Respiratory pattern is regular, symmetrical. GI: No signs and/or symptoms were reported     

      involving the gastrointestinal system. : No signs and/or symptoms were reported           

      regarding the genitourinary system. Derm: Wound noted right arm Wound is large lac on       

      the inner R arm with moderate bleed; reinforced with dressing; Reports blood thinners.      

      Musculoskeletal: No signs and/or symptoms reported regarding the musculoskeletal system.    

11:51 Reassessment: lidocaine and lac repair at bedside;.                                     hj  

11:57 Reassessment: Patient and/or family updated on plan of care and expected duration. Pain hj  

      level reassessed. Patient is alert, oriented x 3, equal unlabored respirations, skin        

      warm/dry/pink. Ed tech cleaned all abrasions and irrigated laceration on R arm.             

12:25 Reassessment: provider informed that pt is prepped for lac repair;.                     hj  

12:54 Reassessment: reminded provider about lac repair; laceration was cleaned and covered    hj  

      with gauze and kerlix;.                                                                     

13:15 Reassessment: xray taken;.                                                              hj  

13:30 Reassessment: Patient and/or family updated on plan of care and expected duration. Pain hj  

      level reassessed. Patient is alert, oriented x 3, equal unlabored respirations, skin        

      warm/dry/pink. assisted provider of lac repair;.                                            

14:17 Reassessment: Patient and/or family updated on plan of care and expected duration. Pain hj  

      level reassessed. Patient is alert, oriented x 3, equal unlabored respirations, skin        

      warm/dry/pink. called wife for pts harrise; Jordana ; [phone];.               

14:33 Reassessment: wife here to  pt;.                                                 hj  

                                                                                                  

Vital Signs:                                                                                      

11:21  / 75; Pulse 84; Resp 18; Temp 98.1(O); Pulse Ox 98% on R/A; Weight 73.03 kg;     hj  

      Height 5 ft. 7 in. (170.18 cm); Pain 5/10;                                                  

12:56  / 75; Pulse 69; Resp 18; Pulse Ox 100% on R/A;                                   hj  

13:07  / 63; Pulse 63; Resp 18; Pulse Ox 100% on R/A;                                   hj  

13:45  / 76; Pulse 79; Resp 18; Pulse Ox 99% on R/A;                                    hj  

14:30  / 80; Pulse 75; Resp 18; Pulse Ox 100% on R/A;                                   hj  

11:21 Body Mass Index 25.22 (73.03 kg, 170.18 cm)                                             hj  

                                                                                                  

Calvin Coma Score:                                                                               

11:21 Eye Response: spontaneous(4). Verbal Response: oriented(5). Motor Response: obeys       hj  

      commands(6). Total: 15.                                                                     

                                                                                                  

Trauma Score (Adult):                                                                             

11:21 Eye Response: spontaneous(1); Verbal Response: oriented(1); Motor Response: obeys       hj  

      commands(2); Systolic BP: > 89 mm Hg(4); Respiratory Rate: 10 to 29 per min(4); Calvin     

      Score: 15; Trauma Score: 12                                                                 

                                                                                                  

ED Course:                                                                                        

11:16 Patient arrived in ED.                                                                  hj  

11[facility]17 [facility]                                                              [facility] 

11:17 Santino Mcnair MD is Attending Physician.                                             jmm 

11:19 Triage completed.                                                                       hj  

11:24 Arm band placed on right wrist.                                                         hj  

11:24 Patient has correct armband on for positive identification. Placed in gown. Bed in low  hj  

      position. Call light in reach. Side rails up X2.                                            

11:24 Patient maintains SpO2 saturation greater than 95% on room air.                         hj  

11:25 Thermoregulation: warm blanket given to patient.                                        hj  

11:41 Iron Raines, RN is Primary Nurse.                                                    hj  

13:40 X-ray completed. Portable x-ray completed in exam room. Patient tolerated procedure     jb2 

      well.                                                                                       

13:41 Elbow Right 3 View XRAY In Process Unspecified.                                         EDMS

13:41 Forearm Left XRAY In Process Unspecified.                                               EDMS

14:31 No provider procedures requiring assistance completed. Patient did not have IV access   hj  

      during this emergency room visit.                                                           

                                                                                                  

Administered Medications:                                                                         

13:45 Drug: Lidocaine (1 %) 20 ml Volume: 20 ml; Route: Infiltration;                         hj  

                                                                                                  

                                                                                                  

Intake:                                                                                           

14:32 PO: 0ml; Total: 0ml.                                                                    hj  

                                                                                                  

Output:                                                                                           

14:32 Urine: 0ml; Total: 0ml.                                                                 hj  

                                                                                                  

Outcome:                                                                                          

14:15 Discharge ordered by MD.                                                                [facility] 

14:32 Discharged to home via wheelchair, with family.                                         hj  

14:32 Condition: stable                                                                           

14:32 Discharge instructions given to patient, family, Instructed on discharge instructions,      

      follow up and referral plans. medication usage, wound care, Demonstrated understanding      

      of instructions, follow-up care, medications, wound care, Prescriptions given X 1.          

14:32 Patient's length of stay in the Emergency Department was greater than 2 hours.          hj  

14:36 Patient left the ED.                                                                    hj  

                                                                                                  

Signatures:                                                                                       

Dispatcher [employer]                           EDMS                                                 

Josue Suarez PA PA   Jos Gaines                              jb2                                                  

Iron Raines RN                      RN   hj                                                   

                                                                                                  

Corrections: (The following items were deleted from the chart)                                    

11:51 11:25 Derm: Reports blood thinners hj                                                   hj  

12:56 11:21 Pulse 84bpm; Resp 18bpm; Pulse Ox 98% RA; Temp 98.1F Oral; 73.03 kg; Height 5 ft. hj  

      7 in.; BMI: 25.2; Pain 5/10; hj                                                             

12:56 11:21 Pulse 69bpm; Resp 18bpm; Pulse Ox 100% RA; hj                                     hj  

13:08 11:21  / 78; Pulse 84bpm; Resp 18bpm; Pulse Ox 98% RA; Temp 98.1F Oral; 73.03 kg; hj  

      Height 5 ft. 7 in.; BMI: 25.2; Pain 5/10; hj                                                

14:18 14:17 Reassessment: Patient and/or family updated on plan of care and expected          hj  

      duration. Pain level reassessed. Patient is alert, oriented x 3, equal unlabored            

      respirations, skin warm/dry/pink. called wife for pts ride; hj                              

                                                                                                  

**************************************************************************************************